# Patient Record
Sex: FEMALE | Race: WHITE | Employment: FULL TIME | URBAN - METROPOLITAN AREA
[De-identification: names, ages, dates, MRNs, and addresses within clinical notes are randomized per-mention and may not be internally consistent; named-entity substitution may affect disease eponyms.]

---

## 2017-08-04 DIAGNOSIS — N90.4 LICHEN SCLEROSUS OF FEMALE GENITALIA: ICD-10-CM

## 2017-08-04 RX ORDER — OSPEMIFENE 60 MG/1
TABLET, FILM COATED ORAL
Qty: 30 TABLET | Refills: 3 | Status: SHIPPED | OUTPATIENT
Start: 2017-08-04 | End: 2017-12-29 | Stop reason: SDUPTHER

## 2017-12-29 DIAGNOSIS — N90.4 LICHEN SCLEROSUS OF FEMALE GENITALIA: ICD-10-CM

## 2018-01-03 RX ORDER — OSPEMIFENE 60 MG/1
TABLET, FILM COATED ORAL
Qty: 30 TABLET | Refills: 0 | Status: SHIPPED | OUTPATIENT
Start: 2018-01-03 | End: 2018-01-31 | Stop reason: SDUPTHER

## 2018-01-31 DIAGNOSIS — N90.4 LICHEN SCLEROSUS OF FEMALE GENITALIA: ICD-10-CM

## 2018-02-01 RX ORDER — OSPEMIFENE 60 MG/1
TABLET, FILM COATED ORAL
Qty: 30 TABLET | Refills: 0 | Status: SHIPPED | OUTPATIENT
Start: 2018-02-01 | End: 2019-12-09 | Stop reason: SDUPTHER

## 2019-04-22 ENCOUNTER — OFFICE VISIT (OUTPATIENT)
Dept: OBGYN | Age: 59
End: 2019-04-22
Payer: COMMERCIAL

## 2019-04-22 ENCOUNTER — HOSPITAL ENCOUNTER (OUTPATIENT)
Age: 59
Setting detail: SPECIMEN
Discharge: HOME | End: 2019-04-22
Payer: COMMERCIAL

## 2019-04-22 VITALS
SYSTOLIC BLOOD PRESSURE: 116 MMHG | HEIGHT: 61 IN | WEIGHT: 152 LBS | BODY MASS INDEX: 28.7 KG/M2 | DIASTOLIC BLOOD PRESSURE: 68 MMHG

## 2019-04-22 DIAGNOSIS — Z01.419 WOMEN'S ANNUAL ROUTINE GYNECOLOGICAL EXAMINATION: Primary | ICD-10-CM

## 2019-04-22 DIAGNOSIS — Z01.419 WOMEN'S ANNUAL ROUTINE GYNECOLOGICAL EXAMINATION: ICD-10-CM

## 2019-04-22 DIAGNOSIS — N90.4 LICHEN SCLEROSUS OF FEMALE GENITALIA: ICD-10-CM

## 2019-04-22 PROCEDURE — G0145 SCR C/V CYTO,THINLAYER,RESCR: HCPCS

## 2019-04-22 PROCEDURE — 99396 PREV VISIT EST AGE 40-64: CPT | Performed by: OBSTETRICS & GYNECOLOGY

## 2019-04-22 RX ORDER — LEVOTHYROXINE SODIUM 0.12 MG/1
TABLET ORAL
Refills: 3 | COMMUNITY
Start: 2019-04-12

## 2019-04-22 RX ORDER — CLOBETASOL PROPIONATE 0.5 MG/G
CREAM TOPICAL
Qty: 30 G | Refills: 1 | Status: SHIPPED | OUTPATIENT
Start: 2019-04-22 | End: 2021-03-30 | Stop reason: SDUPTHER

## 2019-04-22 ASSESSMENT — PATIENT HEALTH QUESTIONNAIRE - PHQ9
SUM OF ALL RESPONSES TO PHQ QUESTIONS 1-9: 0
SUM OF ALL RESPONSES TO PHQ9 QUESTIONS 1 & 2: 0
2. FEELING DOWN, DEPRESSED OR HOPELESS: 0
SUM OF ALL RESPONSES TO PHQ QUESTIONS 1-9: 0
1. LITTLE INTEREST OR PLEASURE IN DOING THINGS: 0

## 2019-04-22 NOTE — PROGRESS NOTES
YEARLY PHYSICAL    Date of service: 2019    Tricia Landis  Is a 66 y.o.   female    PT's PCP is: Rizwana Mills     : 1960                                             Subjective:       No LMP recorded. Patient is postmenopausal.     Are your menses regular: not applicable    OB History    Para Term  AB Living   2 2 2         SAB TAB Ectopic Molar Multiple Live Births                    # Outcome Date GA Lbr Beka/2nd Weight Sex Delivery Anes PTL Lv   2 Term 200 42w0d   M CS-Unspec      1 Term 18 37w0d   F CS-Unspec           Social History     Tobacco Use   Smoking Status Never Smoker   Smokeless Tobacco Never Used        Social History     Substance and Sexual Activity   Alcohol Use Not on file       Family History   Problem Relation Age of Onset    Prostate Cancer Father     Colon Cancer Mother        Allergies: Patient has no known allergies.       Current Outpatient Medications:     levothyroxine (SYNTHROID) 125 MCG tablet, TK 1 T PO QD, Disp: , Rfl: 3    clobetasol (TEMOVATE) 0.05 % cream, Apply a thin layer daily as needed , not to exceed 6 weeks at a time, Disp: 30 g, Rfl: 1    OSPHENA 60 MG TABS, TAKE 1 TABLET BY MOUTH EVERY DAY, Disp: 30 tablet, Rfl: 0    Social History     Substance and Sexual Activity   Sexual Activity Yes    Partners: Male       Any bleeding or pain with intercourse: No    Last Yearly:  2016    Last pap: 2016    Last HPV: never    Last Mammogram: 2018    Last Mariely Gray never    Do you do self breast exams: Yes    Past Medical History:   Diagnosis Date    Thyroid disease        Past Surgical History:   Procedure Laterality Date    CARPAL TUNNEL RELEASE       SECTION         Family History   Problem Relation Age of Onset    Prostate Cancer Father     Colon Cancer Mother        Any family history of breast or ovarian cancer: No    Any family history of blood clots: No      Chief Complaint   Patient presents with    Gynecologic Exam          Nurse: TYSON    PE:  Vital Signs  Blood pressure 116/68, height 5' 1\" (1.549 m), weight 152 lb (68.9 kg), not currently breastfeeding. Labs:    No results found for this visit on 04/22/19. HPI: The patient is here today for a yearly exam.    NoPT denies fever, chills, nausea and vomiting       Objective  Lymphatic:   no lymphadenopathy  Heent:   negative   Cor: regular rate and rhythm, no murmurs              Pul:clear to auscultation bilaterally- no wheezes, rales or rhonchi, normal air movement, no respiratory distress      GI: Abdomen soft, non-tender. BS normal. No masses,  No organomegaly           Extremities: normal strength, tone, and muscle mass   Breasts: Breast:normal appearance, no masses or tenderness, Inspection negative, No nipple retraction or dimpling, No nipple discharge or bleeding, No axillary or supraclavicular adenopathy, Normal to palpation without dominant masses   Pelvic Exam: GENITAL/URINARY:  External Genitalia:  Abnormal findings: The patient does have lichen sclerosis with conglutination of the upper portions of the labia minora and clitoris and clitoral kulkarni  Vagina:  General appearance normal, Discharge absent, Lesions absent, Pelvic support normal, vaginal caliber narrowed  Cervix:  General appearance normal, Lesions absent, Discharge absent, Tenderness absent, Enlargement absent, Nodularity absent  Uterus:  Size normal, Contour normal, Position normal, Masses absent, Consistency; normal, Support normal, Tenderness absent  Adenexa:   Masses absent, Tenderness absent, Enlargement absent, Nodularity absent                                      Vaginal discharge: no vaginal discharge      Uterus: normal size, anteverted, mobile, non-tender, normal shape and consistency     Ovaries: Nonenlarged           Over 50% of time spent on counseling and care coordination on: see assessment and plan Assessment and Plan: I did talk to the patient again regarding her diagnosis of lichen sclerosis and represcribed clobetasol        Diagnosis Orders   1. Women's annual routine gynecological examination  PAP SMEAR   2. Lichen sclerosus of female genitalia  clobetasol (TEMOVATE) 0.05 % cream       I am having Mayra Francis Kil maintain her OSPHENA, levothyroxine, and clobetasol.

## 2019-04-26 LAB — CYTOLOGY REPORT: NORMAL

## 2019-12-09 DIAGNOSIS — N90.4 LICHEN SCLEROSUS OF FEMALE GENITALIA: ICD-10-CM

## 2020-12-30 RX ORDER — OSPEMIFENE 60 MG/1
TABLET, FILM COATED ORAL
Qty: 30 TABLET | Refills: 12 | Status: SHIPPED | OUTPATIENT
Start: 2020-12-30 | End: 2021-03-30 | Stop reason: SDUPTHER

## 2021-03-30 ENCOUNTER — OFFICE VISIT (OUTPATIENT)
Dept: OBGYN | Age: 61
End: 2021-03-30
Payer: COMMERCIAL

## 2021-03-30 ENCOUNTER — HOSPITAL ENCOUNTER (OUTPATIENT)
Age: 61
Setting detail: SPECIMEN
Discharge: HOME | End: 2021-03-30
Payer: COMMERCIAL

## 2021-03-30 VITALS
BODY MASS INDEX: 28.89 KG/M2 | WEIGHT: 153 LBS | SYSTOLIC BLOOD PRESSURE: 130 MMHG | HEIGHT: 61 IN | DIASTOLIC BLOOD PRESSURE: 74 MMHG

## 2021-03-30 DIAGNOSIS — Z01.419 WOMEN'S ANNUAL ROUTINE GYNECOLOGICAL EXAMINATION: ICD-10-CM

## 2021-03-30 DIAGNOSIS — Z12.31 ENCOUNTER FOR SCREENING MAMMOGRAM FOR BREAST CANCER: ICD-10-CM

## 2021-03-30 DIAGNOSIS — N90.4 LICHEN SCLEROSUS OF FEMALE GENITALIA: ICD-10-CM

## 2021-03-30 DIAGNOSIS — Z01.419 WOMEN'S ANNUAL ROUTINE GYNECOLOGICAL EXAMINATION: Primary | ICD-10-CM

## 2021-03-30 PROCEDURE — 99396 PREV VISIT EST AGE 40-64: CPT | Performed by: OBSTETRICS & GYNECOLOGY

## 2021-03-30 PROCEDURE — G0145 SCR C/V CYTO,THINLAYER,RESCR: HCPCS

## 2021-03-30 RX ORDER — OSPEMIFENE 60 MG/1
TABLET, FILM COATED ORAL
Qty: 30 TABLET | Refills: 12 | Status: SHIPPED | OUTPATIENT
Start: 2021-03-30 | End: 2022-03-30

## 2021-03-30 RX ORDER — CLOBETASOL PROPIONATE 0.5 MG/G
CREAM TOPICAL
Qty: 30 G | Refills: 1 | Status: SHIPPED | OUTPATIENT
Start: 2021-03-30

## 2021-03-30 NOTE — PROGRESS NOTES
YEARLY PHYSICAL    Date of service: 3/30/2021    Verónica Marie  Is a 64 y.o.   female    PT's PCP is: Lyle Cortez     : 1960                                             Subjective:       No LMP recorded. Patient is postmenopausal.     Are your menses regular: not applicable    OB History    Para Term  AB Living   2 2 2         SAB TAB Ectopic Molar Multiple Live Births                    # Outcome Date GA Lbr Beka/2nd Weight Sex Delivery Anes PTL Lv   2 Term 200 42w0d   M CS-Unspec      1 Term 18 37w0d   F CS-Unspec           Social History     Tobacco Use   Smoking Status Never Smoker   Smokeless Tobacco Never Used        Social History     Substance and Sexual Activity   Alcohol Use None       Family History   Problem Relation Age of Onset    Prostate Cancer Father     Colon Cancer Mother        Allergies: Patient has no known allergies.       Current Outpatient Medications:     levothyroxine (SYNTHROID) 125 MCG tablet, TK 1 T PO QD, Disp: , Rfl: 3    Ospemifene (OSPHENA) 60 MG TABS, TAKE 1 TABLET BY MOUTH EVERY DAY (Patient not taking: Reported on 3/30/2021), Disp: 30 tablet, Rfl: 12    clobetasol (TEMOVATE) 0.05 % cream, Apply a thin layer daily as needed , not to exceed 6 weeks at a time (Patient not taking: Reported on 3/30/2021), Disp: 30 g, Rfl: 1    Social History     Substance and Sexual Activity   Sexual Activity Yes    Partners: Male       Any bleeding or pain with intercourse: No    Last Yearly:  19    Last pap: 19    Last HPV: never    Last Mammogram: 18    Last Rose Stains never    Do you do self breast exams: Yes    Past Medical History:   Diagnosis Date    Thyroid disease        Past Surgical History:   Procedure Laterality Date    CARPAL TUNNEL RELEASE       SECTION         Family History   Problem Relation Age of Onset    Prostate Cancer Father     Colon Cancer Mother Any family history of breast or ovarian cancer: No    Any family history of blood clots: No      Chief Complaint   Patient presents with    Gynecologic Exam     pt presents for yearly - would like to see if she can go back on osphena          Nurse: zenia    PE:  Vital Signs  Blood pressure 130/74, height 5' 1\" (1.549 m), weight 153 lb (69.4 kg), not currently breastfeeding. Labs:    No results found for this visit on 03/30/21. HPI: The patient is here today for a yearly exam she does want a refill of her medications    NoPT denies fever, chills, nausea and vomiting       Objective  Lymphatic:   no lymphadenopathy  Heent:   negative   Cor: regular rate and rhythm, no murmurs              Pul:clear to auscultation bilaterally- no wheezes, rales or rhonchi, normal air movement, no respiratory distress      GI: Abdomen soft, non-tender. BS normal. No masses,  No organomegaly           Extremities: normal strength, tone, and muscle mass   Breasts: Breast:normal appearance, no masses or tenderness, Inspection negative, No nipple retraction or dimpling, No nipple discharge or bleeding, No axillary or supraclavicular adenopathy, Normal to palpation without dominant masses   Pelvic Exam: GENITAL/URINARY:  External Genitalia:  Abnormal findings: Patient has a significant degree of lichen sclerosis around the clitoral kulkarni and upper labia and also over the superior portion of the perineal body and diffusely over both labia  Vagina:  General appearance normal, Discharge absent, Lesions absent, Pelvic support normal  Cervix:  General appearance normal, Lesions absent, Discharge absent, Tenderness absent, Enlargement absent, Nodularity absent  Uterus:  Size normal, Contour normal, Position normal, Masses absent, Consistency; normal, Support normal, Tenderness absent  Adenexa:   Masses absent, Tenderness absent, Enlargement absent, Nodularity absent                                      Vaginal discharge: no vaginal discharge      Uterus: normal size, anteverted, mobile, non-tender, normal shape and consistency     Ovaries: Nonenlarged nontender           Over 50% of time spent on counseling and care coordination on: see assessment and plan                        Assessment and Plan: Patient has known significant degree of lichen sclerosis so we will refill the steroid cream for this she is using it appropriately we will also refill Osphena for vaginal dryness        Diagnosis Orders   1. Women's annual routine gynecological examination  PAP SMEAR   2. Encounter for screening mammogram for breast cancer  ELY JESSE DIGITAL SCREEN BILATERAL       I am having Mayra Turcios maintain her levothyroxine, clobetasol, and Osphena.

## 2021-04-05 LAB — CYTOLOGY REPORT: NORMAL

## 2022-03-30 DIAGNOSIS — N90.4 LICHEN SCLEROSUS OF FEMALE GENITALIA: ICD-10-CM

## 2022-03-30 RX ORDER — OSPEMIFENE 60 MG/1
TABLET, FILM COATED ORAL
Qty: 30 TABLET | Refills: 12 | Status: SHIPPED | OUTPATIENT
Start: 2022-03-30

## 2023-04-16 DIAGNOSIS — N90.4 LICHEN SCLEROSUS OF FEMALE GENITALIA: ICD-10-CM

## 2023-04-20 ENCOUNTER — TELEPHONE (OUTPATIENT)
Dept: OBGYN | Age: 63
End: 2023-04-20

## 2023-04-20 NOTE — TELEPHONE ENCOUNTER
Patient called and left a voicemail inquiring why her prescription got denied. Attempted to call patient back.  FRANCESCA

## 2023-05-04 RX ORDER — OSPEMIFENE 60 MG/1
TABLET, FILM COATED ORAL
Qty: 30 TABLET | Refills: 12 | OUTPATIENT
Start: 2023-05-04

## 2023-05-19 ENCOUNTER — TELEPHONE (OUTPATIENT)
Dept: OBGYN | Age: 63
End: 2023-05-19

## 2023-06-01 ENCOUNTER — OFFICE VISIT (OUTPATIENT)
Dept: OBGYN | Age: 63
End: 2023-06-01
Payer: COMMERCIAL

## 2023-06-01 ENCOUNTER — HOSPITAL ENCOUNTER (OUTPATIENT)
Age: 63
Setting detail: SPECIMEN
Discharge: HOME | End: 2023-06-01
Payer: COMMERCIAL

## 2023-06-01 VITALS
DIASTOLIC BLOOD PRESSURE: 80 MMHG | BODY MASS INDEX: 28.51 KG/M2 | HEIGHT: 61 IN | WEIGHT: 151 LBS | SYSTOLIC BLOOD PRESSURE: 130 MMHG

## 2023-06-01 DIAGNOSIS — Z01.419 WOMEN'S ANNUAL ROUTINE GYNECOLOGICAL EXAMINATION: Primary | ICD-10-CM

## 2023-06-01 DIAGNOSIS — Z12.31 SCREENING MAMMOGRAM, ENCOUNTER FOR: ICD-10-CM

## 2023-06-01 DIAGNOSIS — N90.4 LICHEN SCLEROSUS OF FEMALE GENITALIA: ICD-10-CM

## 2023-06-01 DIAGNOSIS — Z01.419 WOMEN'S ANNUAL ROUTINE GYNECOLOGICAL EXAMINATION: ICD-10-CM

## 2023-06-01 PROCEDURE — 99396 PREV VISIT EST AGE 40-64: CPT | Performed by: OBSTETRICS & GYNECOLOGY

## 2023-06-01 PROCEDURE — G0145 SCR C/V CYTO,THINLAYER,RESCR: HCPCS

## 2023-06-01 RX ORDER — CLOBETASOL PROPIONATE 0.5 MG/G
CREAM TOPICAL
Qty: 30 G | Refills: 1 | Status: SHIPPED | OUTPATIENT
Start: 2023-06-01

## 2023-06-01 RX ORDER — OSPEMIFENE 60 MG/1
1 TABLET, FILM COATED ORAL DAILY
Qty: 30 TABLET | Refills: 12 | Status: SHIPPED | OUTPATIENT
Start: 2023-06-01

## 2023-06-01 RX ORDER — LIOTHYRONINE SODIUM 5 UG/1
5 TABLET ORAL EVERY MORNING
COMMUNITY
Start: 2023-05-20

## 2023-06-01 NOTE — PROGRESS NOTES
YEARLY PHYSICAL    Date of service: 2023    Jamarcus Wise  Is a 63 y.o.   female    PT's PCP is: No primary care provider on file. : 1960                                             Subjective:       No LMP recorded. Patient is postmenopausal.     Are your menses regular: not applicable    OB History    Para Term  AB Living   2 2 2         SAB IAB Ectopic Molar Multiple Live Births                    # Outcome Date GA Lbr Beka/2nd Weight Sex Delivery Anes PTL Lv   2 Term 200 42w0d   M CS-Unspec      1 Term 18 37w0d   F CS-Unspec           Social History     Tobacco Use   Smoking Status Never   Smokeless Tobacco Never        Social History     Substance and Sexual Activity   Alcohol Use Not Currently       Family History   Problem Relation Age of Onset    Prostate Cancer Father     Colon Cancer Mother        Allergies: Patient has no known allergies.       Current Outpatient Medications:     liothyronine (CYTOMEL) 5 MCG tablet, Take 1 tablet by mouth every morning, Disp: , Rfl:     Ospemifene (OSPHENA) 60 MG TABS, Take 1 tablet by mouth daily, Disp: 30 tablet, Rfl: 12    clobetasol (TEMOVATE) 0.05 % cream, Apply a thin layer daily as needed , not to exceed 6 weeks at a time, Disp: 30 g, Rfl: 1    levothyroxine (SYNTHROID) 125 MCG tablet, TK 1 T PO QD, Disp: , Rfl: 3    Social History     Substance and Sexual Activity   Sexual Activity Yes    Partners: Male    Birth control/protection: Post-menopausal       Any bleeding or pain with intercourse: No    Last Yearly:  3/30/21    Last pap: 3/30/21    Last HPV: never    Last Mammogram: 18    Last Dexascan never    Do you do self breast exams: Yes    Past Medical History:   Diagnosis Date    Thyroid disease        Past Surgical History:   Procedure Laterality Date    CARPAL TUNNEL RELEASE       SECTION         Family History   Problem Relation Age of Onset

## 2023-06-07 LAB — CYTOLOGY REPORT: NORMAL

## 2023-12-05 ENCOUNTER — HOSPITAL ENCOUNTER
Dept: HOSPITAL 101 - MAMMO | Age: 63
Discharge: HOME | End: 2023-12-05
Payer: COMMERCIAL

## 2023-12-05 DIAGNOSIS — Z80.0: ICD-10-CM

## 2023-12-05 DIAGNOSIS — Z80.42: ICD-10-CM

## 2023-12-05 DIAGNOSIS — Z12.31: Primary | ICD-10-CM

## 2023-12-05 PROCEDURE — 77063 BREAST TOMOSYNTHESIS BI: CPT

## 2023-12-05 PROCEDURE — 77067 SCR MAMMO BI INCL CAD: CPT

## 2023-12-05 NOTE — MM_ITS
Patient Name:      
BURTON RAMÍREZ 
      
MR#: [id number]      
: 1960 
Accession #: [id number] 
Exam Date: 2023  
Ordering Doctor: MRS. JODI L. SCHWAB . 
  
RADIOLOGY REPORT 
  
PROCEDURE:     MM TOMOSYNTHESIS SCREENING BI 
  
COMPARISON:     MG MAMM SCREEN TOM W CAD, 2020.  MG MAMM SCREEN 3D TOM  
CAD, 2021. 
  
INDICATIONS:     Tom Screening Mammogram 
  
Calculator Name             NCI Breast Cancer Risk Assessment Tool  
5 Year Breast Cancer Risk     1.60% 
Lifetime Breast Cancer Risk     7.90% 
Personal Breast Cancer      No 
Personal Ovarian Cancer     No 
Treatments     None 
Family Cancers     Mother with colon  cancer at age 72; Father with prostate  
cancer at age 74. 
  
LOCATION:       [facility]  
  
BREAST COMPOSITION:     Scattered areas fibroglandular density. 
  
FINDINGS:       
DIAGNOSTIC CATEGORY 1--NEGATIVE. NO CHANGE FROM COMPARISON ASSESSMENT.   
Scattered benign-appearing calcifications are present.  Scattered  
benign-appearing lymph nodes are present.   
  
RIGHT BREAST:  No significant suspicious finding.   
  
LEFT BREAST:  No significant suspicious finding.   
  
RECOMMENDATIONS:      
ROUTINE MAMMOGRAM AND CLINICAL EVALUATION IN 12 MONTHS.   
  
PLEASE NOTE:  A NORMAL MAMMOGRAM DOES NOT EXCLUDE THE POSSIBILITY OF BREAST  
CANCER.  A CLINICALLY SUSPICIOUS PALPABLE LUMP SHOULD BE BIOPSIED.   
  
  
Dictated by: Aly Bai MD on 2023 at 09:32      
Approved by: Aly Bai MD on 2023 at 09:37

## 2024-07-20 DIAGNOSIS — N90.4 LICHEN SCLEROSUS OF FEMALE GENITALIA: ICD-10-CM

## 2024-07-22 RX ORDER — OSPEMIFENE 60 MG/1
1 TABLET, FILM COATED ORAL DAILY
Qty: 30 TABLET | Refills: 12 | OUTPATIENT
Start: 2024-07-22

## 2024-09-19 ENCOUNTER — HOSPITAL ENCOUNTER
Dept: HOSPITAL 101 - EC | Age: 64
Discharge: HOME | End: 2024-09-19
Payer: COMMERCIAL

## 2024-09-19 DIAGNOSIS — M79.672: Primary | ICD-10-CM

## 2024-09-19 DIAGNOSIS — M79.671: ICD-10-CM

## 2024-09-19 PROCEDURE — 73630 X-RAY EXAM OF FOOT: CPT

## 2024-09-19 NOTE — XR_ITS
The [address] [address] 
     [phone] 
  
  
Patient Name: 
BURTON RAMÍREZ 
  
MRN: [id number]    YOB: 1960    Sex: F 
Assigned Patient Location:  
Current Patient Location:   
Accession/Order Number: [id number] 
Exam Date: 9/19/2024  15:50    Report Date: 9/20/2024  08:34 
  
At the request of: 
GERALDINE MONTANA   
  
Procedure:  XR foot TOM min 3V 
  
EXAMINATION: XR foot TOM min 3V  
  
HISTORY: BILATERAL FOOT PAIN 
  
COMPARISON: No relevant comparison available.  
  
FINDINGS:  
  
RIGHT FINDINGS: 
BONES: No acute fracture or dislocation. Mild degenerative changes  
SOFT TISSUES: Negative. No visible soft tissue swelling.  
OTHER: Negative.  
  
LEFT FINDINGS: 
BONES: No acute fracture or dislocation. Moderate to severe degenerative  
change  
first metatarsal-phalangeal joint with bone-on-bone articulation and marginal  
osteophyte formation  
SOFT TISSUES: Negative. No visible soft tissue swelling.  
OTHER: Negative.  
  
ORDER #: [id number] XR/XR foot TOM min 3V  
IMPRESSION:   
   
Degenerative changes most significant left first metatarsal-phalangeal joint  
   
   
Electronically authenticated by: AMBROSIO SALDANA   Date: 9/20/2024  08:34

## 2025-01-07 ENCOUNTER — HOSPITAL ENCOUNTER
Dept: HOSPITAL 101 - PST | Age: 65
Discharge: HOME | End: 2025-01-07
Payer: COMMERCIAL

## 2025-01-07 DIAGNOSIS — Z12.11: Primary | ICD-10-CM

## 2025-01-15 ENCOUNTER — HOSPITAL ENCOUNTER (OUTPATIENT)
Age: 65
Discharge: HOME | End: 2025-01-15
Payer: COMMERCIAL

## 2025-01-15 VITALS — BODY MASS INDEX: 30.7 KG/M2

## 2025-01-15 VITALS — OXYGEN SATURATION: 99 % | SYSTOLIC BLOOD PRESSURE: 144 MMHG | HEART RATE: 69 BPM | DIASTOLIC BLOOD PRESSURE: 73 MMHG

## 2025-01-15 VITALS — SYSTOLIC BLOOD PRESSURE: 129 MMHG | OXYGEN SATURATION: 99 % | HEART RATE: 68 BPM | DIASTOLIC BLOOD PRESSURE: 74 MMHG

## 2025-01-15 VITALS
DIASTOLIC BLOOD PRESSURE: 85 MMHG | TEMPERATURE: 97.5 F | SYSTOLIC BLOOD PRESSURE: 157 MMHG | OXYGEN SATURATION: 100 % | HEART RATE: 81 BPM

## 2025-01-15 VITALS
OXYGEN SATURATION: 98 % | TEMPERATURE: 97.9 F | HEART RATE: 87 BPM | SYSTOLIC BLOOD PRESSURE: 106 MMHG | DIASTOLIC BLOOD PRESSURE: 73 MMHG

## 2025-01-15 DIAGNOSIS — Z87.891: ICD-10-CM

## 2025-01-15 DIAGNOSIS — Z90.49: ICD-10-CM

## 2025-01-15 DIAGNOSIS — Z80.0: ICD-10-CM

## 2025-01-15 DIAGNOSIS — Z12.11: Primary | ICD-10-CM

## 2025-01-15 DIAGNOSIS — E03.9: ICD-10-CM

## 2025-01-15 PROCEDURE — 45378 DIAGNOSTIC COLONOSCOPY: CPT

## 2025-01-15 NOTE — XMS_ITS
Comprehensive CCD (C-CDA v2.1)  
  
                          Created on: January 15, 2025  
  
  
BURTON RAMÍREZ  
External Reference #: CDR,PersonID:[id number]  
: 1960  
Sex: Female  
  
Demographics  
  
  
                                        Address             [address] [place] [address] [address]  
[place], [place]  [id number]  
   
                                        Home Phone          [phone]  
   
                                        Mobile Phone        [phone]  
   
                                        Preferred Language  en  
   
                                        Marital Status        
   
                                        Orthodox Affiliation Unknown  
   
                                        Race                White  
   
                                        Ethnic Group        Not  or Lati  
no  
  
  
Author  
  
  
                                        Organization        [facility]  
  
  
Care Team Providers  
  
  
                                Care Team Member Name Role            Phone  
   
                                Melinda Wilson   Primary Care Provider [phone]  
   
                                DENG, DR MELINDA OWUSU Admitting       Unavailable  
   
                                DENG, DR MELINDA OWUSU Attending       Unavailable  
   
                                WILSON, DR MELINDA OWUSU Primary Care    Unavailable  
   
                                WILSON, DR MELINDA OWUSU Consulting      Unavailable  
   
                                Unavailable     Primary Care Provider UnavailMANUEL Rich Referring       Unavailable  
   
                                Schwab, Agata GUNN  Primary Care Physician [phone]  
   
                                Schwab, FNP Agata L Attending       Unavailable  
   
                                Schwab, FNP Agata L Admitting       Unavailable  
   
                                NILL, Jeovanny WEBER Attending       Unavailable  
   
                                Schwab, FNP Agata L Referring       Unavailable  
   
                                NILL, Jeovanny WEBER Attending       Unavailable  
   
                                Schwab, FNP Agata L Referring       Unavailable  
   
                                Schwab, FNP Agata L Attending       Unavailable  
   
                                Schwab, FNP Agata L Attending       Unavailable  
   
                                Schwab, FNP Agata L Attending       Unavailable  
   
                                Schwab, FNP Agata L Attending       Unavailable  
  
  
  
Medications  
Current Medications  
  
  
  
                      Medication Drug Class(es) Dates      Sig (Normalized) Sig   
(Original)  
   
                                                    clobetasol   
propionate 0.5 mg/ml   
topical cream  
(2 sources)               Corticosteroid            Start:   
2023                                          clobetasol   
(TEMOVATE) 0.05 %   
cream Indications:   
Lichen sclerosus of   
female genitalia   
Apply a thin layer   
daily as needed ,   
not to exceed 6   
weeks at a time 30   
g 2023   
Active  
  
  
  
                                Start: 2021                 clobetasol (TE  
MOVATE) 0.05 % cream Indications: Lichen   
sclerosus   
of female genitalia Apply a thin layer daily as needed , not to   
exceed 6 weeks at a time 30 g 2021 Active  
  
  
  
                                                    levothyroxine sodium   
0.125 mg oral tablet  
(5 sources)         l-Thyroxine         Start: 2024   take 1 tablet   
by mouth once   
daily                                   levothyroxine 125   
mcg (0.125 mg) Tab   
125 mcg = 1 tab(s),   
Oral, Daily, # 90   
tab(s), Refills(s)   
3, Pharmacy:   
[facility]   
#[id number], 152.8, cm,   
11/15/23 15:35:00   
EST, Height/Length   
Dosing, 72.9, kg,   
11/15/23 15:35:00   
EST, Weight Dosing   
Start Date: 24   
Status: Ordered  
  
  
  
                                        Start: 2024   take 1 tablet by summer  
th once   
daily                                   levothyroxine 125 mcg (0.125 mg) Tab   
125 mcg = 1 tab(s), Oral, Daily, # 90   
tab(s), Refills(s) 0, Pharmacy:   
[facility] #[id number], 152.8,   
cm, 11/15/23 15:35:00 EST,   
Height/Length Dosing, 72.9, kg,   
11/15/23 15:35:00 EST, Weight Dosing   
Start Date: 24 Status: Ordered  
   
                                        Start: 2019   take 1 tablet by summer  
th once   
daily                                   levothyroxine (SYNTHROID) 125 MCG   
tablet TK 1 T PO QD 3 2019   
Active  
  
  
  
                                                    liothyronine   
sodium 0.005 mg   
oral tablet  
(1 source)                l-Triiodothyronine        Start:   
2023                              take 1   
tablet by   
mouth once   
daily in the   
morning                                 liothyronine   
(CYTOMEL) 5 MCG   
tablet Take 1   
tablet by mouth   
every morning 0   
2023 Active  
   
                                                    meloxicam 15 mg   
oral tablet  
(2 sources)                             Nonsteroidal   
Anti-inflammatory Drug                  Start:   
2024                              take 1   
tablet by   
mouth once   
daily                                   meloxicam 15 mg   
Tab See   
Instructions,   
TAKE 1 TABLET BY   
MOUTH DAILY, # 30   
tab(s),   
Refills(s) 0,   
Pharmacy:   
[facility] #[id number],   
152.8, cm,   
24 16:53:00   
EDT,   
Height/Length   
Dosing, 71.2, kg,   
24 16:53:00   
EDT, Weight   
Dosing Start   
Date: 24   
Status: Ordered  
  
  
  
                                        Start: 10-   take 1 tablet by summer  
th once   
daily                                   meloxicam 15 mg Tab See Instructions,   
TAKE 1 TABLET BY MOUTH DAILY, # 30   
tab(s), Refills(s) 0, Pharmacy:   
[facility] #[id number], 152.8,   
cm, 24 16:53:00 EDT,   
Height/Length Dosing, 71.2, kg,   
24 16:53:00 EDT, Weight Dosing   
Start Date: 10/7/24 Status: Ordered  
  
  
  
                                                    ospemifene 60 mg oral   
tablet  
(2 sources)                             Start: 2023   take 1 tablet by   
mouth once daily                        Ospemifene (OSPHENA) 60   
MG TABS Indications:   
Lichen sclerosus of   
female genitalia Take 1   
tablet by mouth daily 30   
tablet 12 2023   
Active  
  
  
  
                                        Start: 2021   take 1 tablet by summer  
th once   
daily                                   Ospemifene (OSPHENA) 60 MG TABS   
Indications: Lichen sclerosus of   
female genitalia TAKE 1 TABLET BY   
MOUTH EVERY DAY 30 tablet 12   
2021 Active  
  
  
  
  
  
Problems  
  
  
                                                    Problem   
Classification  Problem         Date            Documented Date Episodic/Chronic  
   
                                                    Other connective   
tissue disease  
(2 sources)     Foot pain                       2024      Episodic  
   
                                                    Other nutritional;   
endocrine; and   
metabolic disorders  
(2 sources)                             Body mass index 30+ -   
obesity                                 2024          Chronic  
   
                                                    Other nutritional;   
endocrine; and   
metabolic disorders  
(2 sources)                             Obesity caused by   
energy imbalance                        2024          Chronic  
   
                                                    Other screening for   
suspected conditions   
(not mental disorders   
or infectious   
disease)  
(1 source)                              Screening for   
malignant neoplasm of   
colon done;   
Translations:   
[Encounter for   
screening for   
malignant neoplasm of   
colon]                                  Onset:   
2024                                          Episodic  
   
                                                    Residual codes;   
unclassified  
(1 source)                              Family history of   
cancer of colon                         2024          Episodic  
   
                                                    Thyroid disorders  
(3 sources)     Hypothyroidism                  11-      Chronic  
   
                                                    Unclassified  
(6 sources)                             Patient encounter   
status; Translations:   
[Women's annual   
routine gynecological   
examination]                            2024            
  
  
  
Results  
  
  
                          Test Name    Value        Interpretation Reference   
[facility]                                   Facility  
   
                                                    Ambulatory Visit Summaryon 1  
2024   
   
                                                    Ambulatory Visit   
Summary                                 Ambulatory Visit   
Summary  
[Image Removed]  
BURTON RAMÍREZ  
:1960  
MRN:[id number]  
Visit   
Date:2024  
Ambulatory Visit   
Instructions  
Your Diagnosis  
Screening for   
malignant neoplasm   
of colon  
Your Care Team  
Attending   
Physician -  
Jeovanny ROWAN MD  
Primary Care   
Physician -  
Schwab FNP, Jodi L  
Referring   
Physician -  
Schwab FNP, Jodi L  
This Is Your   
Medications List  
Contact   
prescribing   
physician if   
questions or   
concerns  
levothyroxine   
(levothyroxine 125   
mcg (0.125 mg)   
Tab)  
meloxicam   
(meloxicam 15 mg   
Tab)  
Procedures   
Performed  
Appendectomy,   
Carpal tunnel   
release,   
Colonoscopy.  
Discharge Vitals  
Heart Rate   
(Peripheral)  
70  
Respiratory Rate  
16  
Blood Pressure  
122/84  
Height  
152.8 cm  
Height  
60 in  
Weight  
73.6 kg  
Weight  
162.26 lb  
BMI  
31.52  
Medications  
What How Much When   
Instructions  
Unchanged   
levothyroxine   
(levothyroxine 125   
mcg (0.125 mg)   
Tab) 1 Tablets By   
Mouth Every day   
Contact   
prescribing   
physician if   
questions or   
concerns  
Unchanged   
meloxicam   
(meloxicam 15 mg   
Tab) See   
instructions TAKE   
1 TABLET BY MOUTH   
DAILY Contact   
prescribing   
physician if   
questions or   
concerns  
Allergies  
No Known Allergies  
Problems  
Ongoing - Any   
problem that you   
are currently   
receiving   
treatment for.  
BMI   
31.0-31.9,adult  
Class 1 obesity   
due to excess   
calories with body   
mass index (BMI)   
of 30.0 to 30.9 in   
adult  
Colon cancer   
screening  
Family history of   
colon cancer in   
mother  
Hypothyroidism  
Left foot pain  
Screening for   
malignant neoplasm   
of colon  
Wellness   
examination  
Patient Survey  
You may receive a   
survey via text or   
e-mail asking   
about your office   
visit. Please   
share your   
experience with us   
by completing your   
survey. We   
appreciate your   
feedback and thank   
you for choosing   
us for your care.  
[Image Removed]     Normal                                  [facility]  
   
                                                    Family Medicine Office/Clini  
c Noteon 2024   
   
                                                    Family Medicine   
Office/Clinic Note                      Family Medicine   
Office/Clinic Note  
Chief Complaint  
Annual Wellness  
HPI Staff  
Pt is here today   
for annual   
wellness visit.  
Health   
Maintenance:  
Colonoscopy:    
(Normal) believes   
she is due for   
next one.  
Mammo:    
(Normal)  
Pap:  (Normal)   
due  
Last Labs: 23  
Lt foot pain and   
swelling.  
  
History of Present   
Illness  
pt presents today   
for wellness visit  
Review of Systems  
PHQ Score  
Initial Depression   
Screen Score: 0   
SCORE  
Physical Exam  
Vitals &   
Measurements  
T: 36.7   
?C(Temporal   
Artery) HR:   
71(Peripheral) RR:   
16 BP: 128/80   
SpO2: 97%  
HT: 60 in HT:   
152.8 cm WT: 71.20   
kg WT: 156.64 lb   
BMI: 30.5  
General: alert, no   
acute distress  
ENMT: oral mucosa   
moist, no   
pharyngeal   
erythema or   
exudate  
Cardiovascular:   
regular rate and   
rhythm, normal   
peripheral   
perfusion  
Respiratory: Lungs   
CTA, respirations   
non labored  
Extremities: no   
deformity, no   
trauma  
Neurological:   
oriented x 4, LOC   
appropriate for   
age, CN II-XII   
intact, motor   
strength equal &   
normal   
bilaterally,   
speech normal  
Assessment/Plan  
1. Wellness   
examination   
(Z00.00: Encounter   
for general adult   
medical   
examination   
without abnormal   
findings)  
pt presents for   
wellness visit. is   
not quite due for   
labs. does not   
need refills at   
this time. RTC in   
November for lab   
draw  
Ordered:  
CBC w/ Auto Diff  
Comprehensive   
Metabolic Panel  
Est Preventative   
40 to 64 years   
[address]  
Lipid Panel  
Thyroid   
Stimulating   
Hormone  
  
2. Colon cancer   
screening (Z12.11:   
Encounter for   
screening for   
malignant neoplasm   
of colon)  
pt would like   
colonoscopy in   
Given referral   
sent  
Ordered:  
CBC w/ Auto Diff  
Comprehensive   
Metabolic Panel  
Est Preventative   
40 to 64 years   
[address]  
[facility]   
Ambulatory   
Referral  
Lipid Panel  
Thyroid   
Stimulating   
Hormone  
  
3. Hypothyroidism   
(E03.9:   
Hypothyroidism,   
unspecified)  
TSH ordered for   
after   
Ordered:  
CBC w/ Auto Diff  
Comprehensive   
Metabolic Panel  
Est Preventative   
40 to 64 years   
[address]  
Lipid Panel  
Thyroid   
Stimulating   
Hormone  
  
4. Left foot pain   
(M79.672: Pain in   
left foot)  
slight swelling   
noted on top of   
foot above big   
toe. pt is also   
c/o pain on inside   
arch of foot as   
well. will send   
referral to Dr. izquierdo for   
further   
evaluation. pt   
walks on concrete   
8 hours a day at   
work. encouraged   
good supportive   
shoes  
Ordered:  
Est Preventative   
40 to 64 years   
[address]  
[facility]   
Ambulatory   
Referral  
  
5. BMI   
30.0-30.9,adult,   
(Z68.30: Body mass   
index [BMI]   
30.0-30.9,   
adult)Body mass   
index [BMI]   
30.0-30.9, adult  
BMI education   
given  
Ordered:  
meloxicam, 15 mg =   
1 tab(s), Oral,   
Daily, # 30   
tab(s), Refills(s)   
0, Pharmacy:   
[facility] #[id number],   
152.8, cm,   
24 16:53:00   
EDT, Height/Length   
Dosing, 71.2, kg,   
24 16:53:00   
EDT, Weight Dosing  
CBC w/ Auto Diff  
Comprehensive   
Metabolic Panel  
Est Preventative   
40 to 64 years   
[address]  
[facility]   
Ambulatory   
Referral  
[facility]   
Ambulatory   
Referral  
Lipid Panel  
Thyroid   
Stimulating   
Hormone  
  
6. Former smoker   
(Z87.891: Personal   
history of   
nicotine   
dependence)  
continue not   
smoking  
Ordered:  
meloxicam, 15 mg =   
1 tab(s), Oral,   
Daily, # 30   
tab(s), Refills(s)   
0, Pharmacy:   
[facility]   
[facility] #[id number],   
152.8, cm,   
24 16:53:00   
EDT, Height/Length   
Dosing, 71.2, kg,   
24 16:53:00   
EDT, Weight Dosing  
CBC w/ Auto Diff  
Comprehensive   
Metabolic Panel  
Est Preventative   
40 to 64 years   
[address]  
Lipid Panel  
Thyroid   
Stimulating   
Hormone  
  
7. Class 1 obesity   
due to excess   
calories with body   
mass index (BMI)   
of 30.0 to 30.9 in   
adult (E66.09:   
Other obesity due   
to excess   
calories)  
see above  
Ordered:  
meloxicam, 15 mg =   
1 tab(s), Oral,   
Daily, # 30   
tab(s), Refills(s)   
0, Pharmacy:   
[facility]   
[facility] #[id number],   
152.8, cm,   
24 16:53:00   
EDT, Height/Length   
Dosing, 71.2, kg,   
24 16:53:00   
EDT, Weight Dosing  
CBC w/ Auto Diff  
Comprehensive   
Metabolic Panel  
Est Preventative   
40 to 64 years   
[address]  
Lipid Panel  
Thyroid   
Stimulating   
Hormone  
  
Follow-up  
No qualifying data   
available  
Problem List/Past   
Medical History  
Ongoing  
BMI   
30.0-30.9,adult  
Class 1 obesity   
due to excess   
calories with body   
mass index (BMI)   
of 30.0 to 30.9 in   
adult  
Colon cancer   
screening  
Hypothyroidism  
Left foot pain  
Wellness   
examination  
Historical  
No qualifying data  
Procedure/Surgical   
History  
Appendectomy,   
Surgery.  
Medications  
levothyroxine 125   
mcg (0.125 mg)   
Tab, 125 mcg= 1   
tab(s), Oral,   
Daily, 3 refills  
meloxicam 15 mg   
Tab, 15 mg= 1   
tab(s), Oral,   
Daily  
Allergies  
No Known Allergies  
Social History  
Tobacco  
Former smoker,   
quit more than 30   
days ago Tobacco   
Use:. Never   
Smokeless Tobacco   
Use:. Cigarettes,   
Household tobacco   
concerns: No. Yes,   
2024  
Family History  
Cancer: Mother and   
Father.             Normal                                  [facility]  
   
                                        Comment on above:   Result Comment: Elec  
tronically Signed By: Schwab FNP, Jodi L\.br\Date and Time Signed: 24 17:18 EDT   
   
                                                    Reminderson 02-   
   
                                        Reminders           ------------------  
---  
From: Schwab FNP, Jodi L  
To: [facility] -   
Clinical;  
Sent: 2/15/2024   
08:20:47 EST Show   
up: 2/15/2024   
08:21:00 EST  
Subject:   
Ambulatory   
Reminder  
Due Date/Time:   
2024 08:20:00   
EST  
TSh is normal  
Results:  
Date Result Name   
Value Ref Range  
2024 15:39   
TSH 1.32 mcIU/mL   
(0.34 - 5.60)  
Patient informed   
and voices   
understanding.      Normal                                  [facility]  
   
                                                    Ambulatory Visit Summaryon 0  
2024   
   
                                                    Ambulatory Visit   
Summary                                 [Image Removed]  
BURTON RAMÍREZ  
:1960  
MRN:[id number]  
Visit   
Date:2024  
Ambulatory Visit   
Instructions  
Your Care Team  
Attending   
Physician -  
Schwab FNP, Jodi L  
Primary Care   
Physician -  
Schwab FNP, Jodi L  
This Is Your   
Medications List  
levothyroxine   
(levothyroxine 125   
mcg (0.125 mg)   
Tab)  
Procedures   
Performed  
Appendectomy,   
Surgery.  
Medications  
What How Much When   
Instructions  
Unchanged   
levothyroxine   
(levothyroxine 125   
mcg (0.125 mg)   
Tab) 1 Tablets By   
Mouth Every day  
Allergies  
No Known Allergies  
Problems  
Ongoing - Any   
problem that you   
are currently   
receiving   
treatment for.  
Hypothyroidism  
Patient Survey  
You may receive a   
survey via text or   
e-mail asking   
about your office   
visit. Please   
share your   
experience with us   
by completing your   
survey. We   
appreciate your   
feedback and thank   
you for choosing   
us for your care.  
[Image Removed]     Normal                                  [facility]  
   
                                                    CHEMISTRYOrdered By: SYSTEM   
SYSTEM on 2024   
   
                          TSH Qn       1.32 m[IU]/L Normal       0.34 - 5.60   
mcIU/mL                                 [facility]  
   
                                                    Nurse Consultation Noteon    
   
                                                    Nurse Consultation   
Note                                    Reason for Visit  
blood draw  
Assessment/Plan  
This nurse   
verifies patient's   
 and order.   
Area is cleansed   
with alcohol swab,   
turnique applied.   
Blood is drawn   
into a light green   
tube out of the   
left antecubital   
area. bandage   
applied to area.  
Medications  
levothyroxine 125   
mcg (0.125 mg)   
Tab, 125 mcg= 1   
tab(s), Oral,   
Daily  
Allergies  
No Known Allergies  Normal                                  [facility]  
   
                                                    TSHon 2024   
   
                      TSH Qn     1.32 m[IU]/L Normal     0.34-5.60  [facility]  
   
                                        Comment on above:   Performed By: #### 2  
[id number] ####  
[facility]  
[address] [place] [address]   
   
                                                    CHEMISTRYOrdered By: SYSTEM   
SYSTEM on 2023   
   
                          Albumin [Mass/Vol] 4.2 g/dL     Normal       3.3 - 5.0  
   
gm/dL                                   [facility]  
   
                                                    Albumin/Globulin [Mass   
ratio]          1.3 {ratio}     Normal          1.1 - 2.2       [facility]  
   
                                                    ALP [Catalytic   
activity/Vol]       63 [iU]/d           Normal              21 - 98   
Int._Unit/L                             [facility]  
   
                                                    ALT No additional   
P-5'-P [Catalytic   
activity/Vol]       16 [iU]/d           Normal              6 - 46   
Int._Unit/L                             [facility]  
   
                      Anion gap [Moles/Vol] 10 mmol/L  Normal     6 - 16 mEq/L F  
[facility]  
   
                                                    AST [Catalytic   
activity/Vol]       19 [iU]/d           Normal              5 - 43   
Int._Unit/L                             [facility]  
   
                          Bilirubin [Mass/Vol] 0.4 mg/dL    Normal       0.0 - 1  
.1   
mg/dL                                   [facility]  
   
                          Calcium [Mass/Vol] 10.0 mg/dL   Normal       8.9 - 11.  
1   
mg/dL                                   [facility]  
   
                          Chloride [Moles/Vol] 106 mmol/L   Normal       101 - 1  
11   
mmol/L                                  [facility]  
   
                          Cholesterol [Mass/Vol] 209 mg/dL    High         120 -  
 200   
mg/dL                                   [facility]  
   
                                                    Cholesterol in HDL   
[Mass/Vol]                67 mg/dL                  Invalid   
Interpretation   
Code                                                [facility]  
   
                                        Comment on above:   Interpretive Data: H  
DL > or equal to 60 mg/dL: Low   
cardiovascular risk  
HDL < 40 mg/dL : High cardiovascular risk   
   
                                                    Cholesterol in LDL   
[Mass/Vol]      117 mg/dL       Normal          <=129mg/dL      [facility]  
   
                                                    Cholesterol in VLDL   
[Mass/Vol]      17 mg/dL        Normal          7 - 40 mg/dL    [facility]  
   
                          CO2 [Moles/Vol] 29 mmol/L    Normal       21 - 31   
mmol/L                                  [facility]  
   
                          Creatinine [Mass/Vol] 0.8 mg/dL    Normal       0.5 -   
1.3   
mg/dL                                   [facility]  
   
                                                    GFR/1.73 sq   
M.predicted among   
non-blacks MDRD   
(S/P/Bld) [Vol   
rate/Area]          84 mL/min/1.73 m2   Normal              >=59mL/min/1.  
73 m2                                   [facility]  
   
                                        Comment on above:   Interpretive Data: C  
hronic kidney disease could be indicated   
at   
eGFR's of less than 60 mL/min/1.73m2. Kidney failure is   
indicated at less than 15 mL/min/1.73m2.   
   
                                                    Globulin (S)   
[Mass/Vol]          3.2 g/dL            Normal              1.4 - 4.0   
gm/dL                                   [facility]  
   
                          Glucose [Mass/Vol] 97 mg/dL     Normal       55 - 199   
mg/dL                                   [facility]  
   
                                        Comment on above:   Interpretive Data: I  
f this glucose result represents a fasting  
   
glucose, interpretation should refer to the following reference   
range: 55-99 mg/dL   
   
                          Potassium [Moles/Vol] 4.2 mmol/L   Normal       3.5 -   
5.3   
mmol/L                                  [facility]  
   
                          Protein [Mass/Vol] 7.4 g/dL     Normal       6.0 - 7.8  
   
gm/dL                                   [facility]  
   
                          Sodium [Moles/Vol] 141 mmol/L   Normal       135 - 145  
   
mmol/L                                  [facility]  
   
                                                    Triglyceride   
[Mass/Vol]      85 mg/dL        Normal          <=149mg/dL      [facility]  
   
                          TSH Qn       1.81 m[IU]/L Normal       0.34 - 5.60   
mcIU/mL                                 [facility]  
   
                                                    Urea nitrogen   
[Mass/Vol]      29 mg/dL        High            5 - 21 mg/dL    [facility]  
   
                                                    Urea   
nitrogen/Creatinine   
[Mass ratio]    36 mg/mg        High            10 - 20         [facility]  
   
                                                    HEMATOLOGYOrdered By: SYSTEM  
 SYSTEM on 2023   
   
                                                    Basophils/100 WBC   
(Bld)           0.4 %           Normal          0.0 - 2.0 %     [facility]   
[facility]  
   
                                                    Basophils/Leukocytes   
Auto (Bld) [Pure #   
fraction]           0.0 E9/L            Normal              0.0 - 0.2   
E9/L                                    [facility]   
[facility]  
   
                                                    Eosinophils/100 WBC   
(Bld)           2.8 %           Normal          0.0 - 8.0 %     [facility]   
[facility]  
   
                                                    Eosinophils/Leukocytes   
Auto (Bld) [Pure #   
fraction]           0.2 E9/L            Normal              0.0 - 0.5   
E9/L                                    [facility]   
[facility]  
   
                                                    Lymphocytes/100 WBC   
(Bld)           28.1 %          Normal          14.0 - 50.0 %   [facility]   
[facility]  
   
                                                    Lymphocytes/Leukocytes   
Auto (Bld) [Pure #   
fraction]           2.0 E9/L            Normal              1.0 - 4.0   
E9/L                                    [facility]   
[facility]  
   
                                                    Monocytes/100 WBC   
(Bld)           8.2 %           Normal          4.0 - 14.0 %    [facility]   
[facility]  
   
                                                    Monocytes/Leukocytes   
Auto (Bld) [Pure #   
fraction]           0.6 E9/L            Normal              0.2 - 1.0   
E9/L                                    [facility]   
[facility]  
   
                                                    Neutrophils/100 WBC   
(Bld)           60.5 %          Normal          36.0 - 75.0 %   [facility]   
[facility]  
   
                                                    Neutrophils/Leukocytes   
Auto (Bld) [Pure #   
fraction]           4.4 E9/L            Normal              2.0 - 7.5   
E9/L                                    [facility]   
[facility]  
   
                                                    HEMATOLOGYOrdered By: Edith Neal on 2023   
   
                                                    Erythrocyte   
distribution width   
(RBC) [Ratio]   13.6 %          Normal          10.9 - 14.2 %   [facility]   
[facility]  
   
                                                    Hematocrit (Bld)   
[Volume fraction] 39.3 %          Normal          34.0 - 46.0 %   [facility]   
[facility]  
   
                                                    Hemoglobin (Bld)   
[Mass/Vol]          13.1 g/dL           Normal              12.0 - 16.0   
gm/dL                                   [facility]   
[facility]  
   
                                                    MCH (RBC) [Entitic   
mass]               31.1 pg             Normal              27.0 - 34.0   
pg                                      [facility]   
[facility]  
   
                          MCHC (RBC) [Mass/Vol] 33.3 g/dL    Normal       31.4 -  
 36.0   
gm/dL                                   [facility]   
[facility]  
   
                                                    MCV (RBC) [Entitic   
vol]                93.6 fL             Normal              80.0 - 100.0   
fL                                      [facility]   
[facility]  
   
                                                    Platelet mean volume   
(Bld) [Entitic vol] 9.0 fL          Normal          6.4 - 10.8 fL   [facility]   
[facility]  
   
                                                    Platelets (Bld)   
[#/Vol]             306.0 E9/L          Normal              150.0 - 500.0   
E9/L                                    [facility]   
[facility]  
   
                          RBC (Bld) [#/Vol] 4.2 E12/L    Low          4.3 - 5.9   
E12/L                                   [facility]   
[facility]  
   
                                                    WBC corrected for nucl   
RBC Auto (Bld) [#/Vol] 7.2 E9/L            Normal              4.0 - 11.0   
E9/L                                    [facility]   
[facility]  
   
                                                    Cytologyon 2023   
   
                                        Cytology            (NOTE)  
Path Number:   
[id number]  
DIAGNOSIS  
Cervical material,   
(ThinPrep vial,   
Imaging-assisted   
review):  
Specimen Adequacy:  
Satisfactory for   
evaluation.  
-Endocervical/mcdowell  
sformation zone   
component is   
absent.  
Descriptive   
Diagnosis:  
Atypical squamous   
cells of   
undetermined   
significance   
(ASC-US).  
  
Cytotech Screener:   
ARMANI  
**Electronically   
Signed Out**  
Montez Kumar M.D.  
reanna/2023  
Procedure/Addendum  
HPV Procedure   
Report  
High Risk HPV   
testing was   
ordered Date   
Ordered: 2023  
Status: Ordered  
Source of   
Specimen:  
A: Cervical   
material,   
(ThinPrep vial,   
Imaging-assisted   
review)  
HPV   
Reflex?...........  
...........HPV if   
Abnormal  
Clinical History  
Postmenopausal  
Z01.419 Routine   
gyn exam without   
abnormal findings  
Processing Lab:   
[address]   
[id number]  
Interpretation   
performed at [address]   
[id number]  
The Pap smear is a   
screening test   
primarily for   
squamous   
epithelial  
lesions, which is   
subject to both   
false negative and   
false positive  
results. Your   
patient should be   
reminded to   
consult you   
immediately if  
she experiences   
any suspicious   
signs or symptoms,   
regardless of her  
Pap smear result.  
GYNECOLOGIC   
CYTOLOGY REPORT  
Patient Name:   
BURTON RAMÍREZ  
[facility] Rec: [id number]  
[facility]  
CONS[facility]   
[facility]   
[facility]  
ANATOMIC PATHOLOGY  
[address] [place] [phone]  
Fax: [phone]            Normal                                  [facility]  
   
                                        Comment on above:   Performed By: #### P  
PPVP ####  
[facility]  
[address] [place] [id number] [phone]  
: Kalpesh Wooten MD   
   
                                                    CBC AUTO DIFFon 2022   
   
                      BASO #     0.0 103/ul Normal     0.0-0.1    [facility]  
   
                                        Comment on above:   Performed By: #### C  
BC ####  
[facility]  
[address] [place], [place] [address]  
Dr. Xuan Westfall   
   
                                                    Basophils/100 WBC   
(Bld)           0.7 %           Normal          0.2-2.0         [facility]  
   
                                        Comment on above:   Performed By: #### C  
BC ####  
[facility]  
[address] [place] [address]  
Dr. Xuan Westfall   
   
                      EO #       0.1 103/ul Normal     0.0-0.7    [facility]  
   
                                        Comment on above:   Performed By: #### C  
BC ####  
[facility]  
[address] [place] [address]  
Dr. Xuan Westfall   
   
                                                    Eosinophils/100 WBC   
(Bld)           2.4 %           Normal          0.9-7.0         [facility]  
   
                                        Comment on above:   Performed By: #### C  
BC ####  
[facility]  
[address] [place] [address]  
Dr. Xuan Westfall   
   
                                                    Erythrocyte   
distribution width   
(RBC) [Ratio]   12.5 %          Normal          11.0-15.0       [facility]  
   
                                        Comment on above:   Performed By: #### C  
BC ####  
[facility]  
[address] [place] [address]  
Dr. Xuan Westfall   
   
                                                    Hematocrit (Bld)   
[Volume fraction] 40.5 %          Normal          36.0-48.0       [facility]  
   
                                        Comment on above:   Performed By: #### C  
BC ####  
[facility]  
[address] [place] [address]  
Dr. Xuan Westfall   
   
                                                    Hemoglobin (Bld)   
[Mass/Vol]      13.6 g/dL       Normal          12.0-16.0       [facility]  
   
                                        Comment on above:   Performed By: #### C  
BC ####  
[facility]  
[address] [place] [address]  
Dr. Xuan Westfall   
   
                      IG #       0.01 10e3/ul Normal     0.00-0.03  [facility]  
   
                                        Comment on above:   Performed By: #### C  
BC ####  
[facility]  
[address] [place] [address]  
Dr. Xuan Westfall   
   
                      IG %       0.2 %      Normal     0.0-0.5    [facility]  
   
                                        Comment on above:   Performed By: #### C  
BC ####  
[facility]  
[address] [place] [address]  
Dr. Xuan Westfall   
   
                      LYMPH #    1.3 103/ul Normal     1.2-3.8    [facility]  
   
                                        Comment on above:   Performed By: #### C  
BC ####  
[facility]  
[address] [place] [address]  
Dr. Xuan Westfall   
   
                                                    Lymphocytes/100 WBC   
(Bld)           23.3 %          Normal          20.5-60.0       [facility]  
   
                                        Comment on above:   Performed By: #### C  
BC ####  
[facility]  
[address] [place] [address]  
Dr. Xuan Westfall   
   
                      MANUAL DIFF REQ NO         Normal                [facility]  
   
                                        Comment on above:   Performed By: #### C  
BC ####  
[facility]  
[address] [place] [address]  
Dr. Xuan Westfall   
   
                                                    MCH (RBC) [Entitic   
mass]           31.2 pg         Normal          26.7-34.0       [facility]  
   
                                        Comment on above:   Performed By: #### C  
BC ####  
[facility]  
[address] [place] [address]  
Dr. Xuan Westfall   
   
                      MCHC (RBC) [Mass/Vol] 33.6 g/dL  Normal     29.9-35.2  [facility]  
 [facility]  
   
                                        Comment on above:   Performed By: #### C  
BC ####  
[facility]  
[address] [place] [address]  
Dr. Xuan Westfall   
   
                                                    MCV (RBC) [Entitic   
vol]            92.9 fL         Normal          81.0-99.0       [facility]  
   
                                        Comment on above:   Performed By: #### C  
BC ####  
[facility]  
[address] [place] [address]  
Dr. Xuan Westfall   
   
                      MONO #     0.5 103/ul Normal     0.3-0.8    [facility]  
   
                                        Comment on above:   Performed By: #### C  
BC ####  
[facility]  
[address] [place] [address]  
Dr. Xuan Westfall   
   
                                                    Monocytes/100 WBC   
(Bld)           9.0 %           Normal          1.7-12.0        [facility]  
   
                                        Comment on above:   Performed By: #### C  
BC ####  
[facility]  
[address] [place] [address]  
Dr. Xuan Westfall   
   
                      NEUT #     3.5 103/ul Normal     1.4-6.5    [facility]  
   
                                        Comment on above:   Performed By: #### C  
BC ####  
[facility]  
[address] [place] [address]  
Dr. Xuan Westfall   
   
                                                    Neutrophils/100 WBC   
(Bld)           64.4 %          Normal          43.0-75.0       [facility]  
   
                                        Comment on above:   Performed By: #### C  
BC ####  
[facility]  
[address] [place] [address]  
Dr. Xuan Westfall   
   
                                                    Platelet mean volume   
(Bld) [Entitic vol] 9.6 fL          Normal          9.5-13.5        [facility]  
   
                                        Comment on above:   Performed By: #### C  
BC ####  
[facility]  
[address] [place] [address]  
Dr. Xuan Westfall   
   
                      PLT        290 103/ul Normal     150-450    [facility]  
   
                                        Comment on above:   Performed By: #### C  
BC ####  
[facility]  
[address] [place] [address]  
Dr. Xuan Westfall   
   
                      RBC        4.36 106/ul Normal     4.20-5.40  [facility]  
   
                                        Comment on above:   Performed By: #### C  
BC ####  
[facility]  
[address] [place] [address]  
Dr. Xuan Westfall   
   
                      WBC        5.5 103/ul Normal     4.0-11.0   [facility]  
   
                                        Comment on above:   Performed By: #### C  
BC ####  
[facility]  
[address] [place] [address]  
Dr. Xuan Westfall   
   
                                                    FREE T3on 2022   
   
                      FREE T3    2.22 pg/mlL Normal     2.18-3.98  [facility]  
   
                                        Comment on above:   Performed By: #### C  
MP, TSH, FT3, LIPID ####  
[facility]  
[address] [place] [address]  
Dr. Xuan Westfall   
   
                                                    FREE T4on 2022   
   
                      Free T4 [Mass/Vol] 0.94 ng/dL Normal     0.76-1.46  [facility]  
   
                                        Comment on above:   Performed By: #### F  
T4 ####  
[facility]  
[address] [place] [address]  
Dr. Xuan Westfall   
   
                                                    LIPID PROFILEon 2022   
   
                      CHOL-HDL RATIO NORM SEE BELOW  Normal                [facility]  
   
                                        Comment on above:   Result Comment: 3.3   
- 4.4 LOW RISK 4.4 - 7.1 AVERAGE RISK 7.1   
-   
11.0 MODERATE RISK >11.0 HIGH RISK   
   
                                                            Performed By: #### C  
MP, TSH, FT3, LIPID ####  
[facility]  
[address] [place] [address]  
Dr. Xuan Westfall   
   
                      Cholesterol [Mass/Vol] 186 mg/dL  Normal     <=200      [facility]  
[facility]  
   
                                        Comment on above:   Performed By: #### C  
MP, TSH, FT3, LIPID ####  
[facility]  
[address] [place] [address]  
Dr. Xuan Westfall   
   
                                                    Cholesterol in HDL   
[Mass/Vol]      86 mg/dL        Critically high 40-60           [facility]  
   
                                        Comment on above:   Performed By: #### C  
MP, TSH, FT3, LIPID ####  
[facility]  
[address] [place] [address]  
Dr. Xuan Westfall   
   
                                                    Cholesterol in LDL   
[Mass/Vol]      89.8 mg/dL      Normal                          [facility]  
   
                                        Comment on above:   Performed By: #### C  
MP, TSH, FT3, LIPID ####  
[facility]  
[address] [place] [address]  
Dr. Xuan Westfall   
   
                                                    Cholesterol.total/Chol  
esterol in HDL [Mass   
ratio]          2.2 {ratio}     Normal                          [facility]  
   
                                        Comment on above:   Performed By: #### C  
MP, TSH, FT3, LIPID ####  
[facility]  
[address] [place] [address]  
Dr. Xuan Westfall   
   
                                        HDL NORMAL          > or = 60 mg/dl -   
LOW CARDIOVASCULAR   
RISK <40 mg/dl -   
HIGH   
CARDIOVASCULAR   
RISK                Normal                                  [facility]  
   
                                        Comment on above:   Performed By: #### C  
MP, TSH, FT3, LIPID ####  
[facility]  
[address] [place] [address]  
Dr. Xuan Westfall   
   
                      LDL CALC NORMAL SEE BELOW  Normal                [facility]  
   
                                        Comment on above:   Result Comment: <100  
 mg/dl OPTIMAL 100 - 129 mg/dl NEAR OR   
ABOVE OPTIMAL 130 - 159 mg/dl BORDERLINE HIGH 160 - 189 mg/dl   
HIGH >190 mg/dl VERY HIGH   
   
                                                            Performed By: #### C  
MP, TSH, FT3, LIPID ####  
[facility]  
[address] [place] [address]  
Dr. Xuan Westfall   
   
                                                    Triglyceride   
[Mass/Vol]      51 mg/dL        Normal          <=150           [facility]  
   
                                        Comment on above:   Performed By: #### C  
MP, TSH, FT3, LIPID ####  
[facility]  
[address] [place] [address]  
Dr. Xuan Westfall   
   
                      VLDL CALC  10.2 mg/dL Normal                [facility]  
   
                                        Comment on above:   Performed By: #### C  
MP, TSH, FT3, LIPID ####  
[facility]  
[address] [place] [address]  
Dr. Xuan Westfall   
   
                                                    PROF 14(COMP METB)on   
022   
   
                      Albumin [Mass/Vol] 3.8 g/dL   Normal     3.4-5.0    [facility]  
   
                                        Comment on above:   Performed By: #### C  
MP, TSH, FT3, LIPID ####  
[facility]  
[address] [place] [address]  
Dr. Xuan Westfall   
   
                                                    Albumin/Globulin [Mass   
ratio]          1.2 {ratio}     Normal                          [facility]  
   
                                        Comment on above:   Performed By: #### C  
MP, TSH, FT3, LIPID ####  
[facility]  
[address] [place] [address]  
Dr. Xuan Westfall   
   
                                                    ALP [Catalytic   
activity/Vol]   68 U/L          Normal                    [facility]  
   
                                        Comment on above:   Performed By: #### C  
MP, TSH, FT3, LIPID ####  
[facility]  
[address] [place] [address]  
Dr. Xuan Westfall   
   
                                                    ALT [Catalytic   
activity/Vol]   22 U/L          Normal          14-59           [facility]  
   
                                        Comment on above:   Performed By: #### C  
MP, TSH, FT3, LIPID ####  
[facility]  
[address] [place] [address]  
Dr. Xuan Westfall   
   
                      Anion gap [Moles/Vol] 10.1 mmol/L Normal                [facility]  
   
                                        Comment on above:   Performed By: #### C  
MP, TSH, FT3, LIPID ####  
[facility]  
[address] [place] [address]  
Dr. Xuan Westfall   
   
                                                    AST [Catalytic   
activity/Vol]   18 U/L          Normal          15-37           [facility]  
   
                                        Comment on above:   Performed By: #### C  
MP, TSH, FT3, LIPID ####  
[facility]  
[address] [place] [address]  
Dr. Xuan Westfall   
   
                      Bilirubin [Mass/Vol] 0.8 mg/dL  Normal     0.2-1.0    [facility]  
   
                                        Comment on above:   Performed By: #### C  
MP, TSH, FT3, LIPID ####  
[facility]  
[address] [place] [address]  
Dr. Xuan Westfall   
   
                      Calcium [Mass/Vol] 9.0 mg/dL  Normal     8.5-10.1   [facility]  
   
                                        Comment on above:   Performed By: #### C  
MP, TSH, FT3, LIPID ####  
[facility]  
[address] [place] [address]  
Dr. Xuan Westfall   
   
                      Chloride [Moles/Vol] 104 mmol/L Normal          [facility]  
   
                                        Comment on above:   Performed By: #### C  
MP, TSH, FT3, LIPID ####  
[facility]  
[address] [place] [address]  
Dr. Xuan Westfall   
   
                      CO2 [Moles/Vol] 30.7 mmol/L Normal     21.0-32.0  [facility]  
   
                                        Comment on above:   Performed By: #### C  
MP, TSH, FT3, LIPID ####  
[facility]  
[address] [place] [address]  
Dr. Xuan Westfall   
   
                      Creatinine [Mass/Vol] 0.81 mg/dL Normal     0.55-1.02  [facility]  
   
                                        Comment on above:   Performed By: #### C  
MP, TSH, FT3, LIPID ####  
[facility]  
[address] [place] [address]  
Dr. Xuan Westfall   
   
                      EGFR-AF AMERICAN >60        Normal     >=60       [facility]  
   
                                        Comment on above:   Performed By: #### C  
MP, TSH, FT3, LIPID ####  
[facility]  
[address] [place] [address]  
Dr. Xuan Westfall   
   
                      EGFR-NON AF AMERICAN >60        Normal     >=60       [facility]  
   
                                        Comment on above:   Performed By: #### C  
MP, TSH, FT3, LIPID ####  
[facility]  
[address] [place] [address]  
Dr. Xuan Westfall   
   
                                                    Globulin (S)   
[Mass/Vol]      3.2 g/dL        Normal                          [facility]  
   
                                        Comment on above:   Performed By: #### C  
MP, TSH, FT3, LIPID ####  
[facility]  
[address] [address]  
Dr. Xuan Westfall   
   
                      Glucose [Mass/Vol] 86 mg/dL   Normal          [facility]  
   
                                        Comment on above:   Performed By: #### C  
MP, TSH, FT3, LIPID ####  
[facility]  
[address] [address]  
Dr. Xuan Westfall   
   
                      Potassium [Moles/Vol] 3.8 mmol/L Normal     3.5-5.1    [facility]  
   
                                        Comment on above:   Performed By: #### C  
MP, TSH, FT3, LIPID ####  
[facility]  
[address] [place] [address]  
Dr. Xuan Westfall   
   
                      Protein [Mass/Vol] 7.0 g/dL   Normal     6.4-8.2    [facility]  
   
                                        Comment on above:   Performed By: #### C  
MP, TSH, FT3, LIPID ####  
[facility]  
[address] [place] [address]  
Dr. Xuan Westfall   
   
                      Sodium [Moles/Vol] 141 mmol/L Normal     136-145    [facility]  
   
                                        Comment on above:   Performed By: #### C  
MP, TSH, FT3, LIPID ####  
[facility]  
[address] [place] [address]  
Dr. Xuan Westfall   
   
                                                    Urea nitrogen   
[Mass/Vol]      15.0 mg/dL      Normal          7.0-18.0        [facility]  
   
                                        Comment on above:   Performed By: #### C  
MP, TSH, FT3, LIPID ####  
[facility]  
[address] [place] [address]  
Dr. Xuan Westfall   
   
                                                    Urea   
nitrogen/Creatinine   
[Mass ratio]    18.5 mg/mg      Normal                          [facility]  
   
                                        Comment on above:   Performed By: #### C  
MP, TSH, FT3, LIPID ####  
[facility]  
[address] [place] [address]  
Dr. Xuan Westfall   
   
                                                    TSHon 2022   
   
                      TSH        2.843 uIU/mL Normal     0.358-3.740 [facility]  
   
                                        Comment on above:   Performed By: #### C  
MP, TSH, FT3, LIPID ####  
[facility]  
[address] [address]  
Dr. Xuan Westfall   
   
                                                    HAND RIGHT 3 VWSon 10-  
9   
   
                                        HAND RIGHT 3 VWS    [facility]  
Department of   
Radiology  
[address] [place]   
[address] [phone]  
  
  
  
==================  
==  
Patient Name:   
BURTON RAMÍREZ  
: 1960  
Sex: F  
Age:  
Race: White  
MRN: [id number]  
Pt. Location:   
Patient Status: O  
Visit #:   
[id number]  
Ordered Date:   
10/16/2019   
10:40:00 AM  
Completed Date:   
10/16/2019 10:36   
AM  
Requesting   
Provider:   
ANITA REILLY  
Attending   
Provider:   
ANITA REILLY  
Report Copy To:  
Signs & Symptoms:   
S67.190A Crushing   
injury of right   
index finger,   
initial encounter   
I10  
History: [place]  
Comments: , Views   
(X-RAY, HAND): PA,   
Lateral, Oblique ,   
Weight Bearing?: N   
, Views (X-RAY,   
HAND): PA,   
Lateral, Oblique ,   
Weight Bearing?: N   
, , ========== ,   
Ordering Provider   
- ANITA REILLY MD , ==========  
Exam: HAND RIGHT 3   
VWS  
Accession #:   
[id number]  
==================  
==  
HAND RIGHT 3 VWS   
10/16/2019 10:36   
AM EDT  
  
SIGNS AND   
SYMPTOMS: S67.190A   
Crushing injury of   
right index   
finger,  
initial encounter   
I10  
  
TECHNOLOGIST   
COMMENTS: rt 2nd   
digit injury 2   
weeks ago  
  
QUESTION FOR THE   
RADIOLOGIST: ,   
Views (X-RAY,   
HAND): PA,   
Lateral,  
Oblique , Weight   
Bearing?: N ,   
Views (X-RAY,   
HAND): PA,   
Lateral,  
Oblique , Weight   
Bearing?: N , ,   
========== ,   
Ordering Provider   
-  
ANITA REILLY MD   
, ==========  
  
PROTOCOL:   
AP,Lateral and   
Oblique views were   
obtained.  
  
COMPARISON: None  
  
FINDINGS:  
  
Soft tissues:  
Mild swelling  
  
Bones:  
No fracture  
  
Joints:  
Mild arthritis   
diffusely  
Pronounced   
spurring at the   
dorsal aspect of   
the long finger   
DIP joint  
  
  
  
IMPRESSION:  
  
1. No acute   
fracture  
2. Apparent   
flexion deformity   
at the index   
finger DIP joint  
3. Scattered   
spurring, most   
pronounced at the   
long finger DIP   
joint  
dorsally  
  
Electronically   
signed by:Rudi Oden.  
  
  
  
Transcribed by:   
[email or web address], User  
Resident:  
Electronically   
Signed by: RUDI ODEN @ 10/16/2019   
01:16 PM            Normal                                  [facility]  
   
                                        Comment on above:   Order Comment: , Vie  
ws (X-RAY, HAND): PA, Lateral, Oblique ,   
Weight Bearing?: N , Views (X-RAY, HAND): PA, Lateral, Oblique   
, Weight Bearing?: N , , ========== , Ordering Provider - ANITA REILLY MD , ==========   
  
  
  
Vital Signs  
  
  
                      Date Time  Vital Sign Value      Performing Clinician Santy fonseca  
   
                                                    2024   
14:                              Blood Pressure   
Location                                            [facility]  
Work Phone:   
[phone]                           [facility]   
[facility]  
   
                                                    2024   
14:                              Diastolic blood   
pressure                  84 mm[Hg]                 Jeovanny NILL  
Work Phone:   
[phone]                           [facility]   
Given  
   
                                                    2024   
14:          Heart rate          70 /min             Jeovanny SkyriderL  
Work Phone:   
[phone]                           [facility]   
Given  
   
                                                    2024   
14:          Respiratory rate    16 /min             Jeovanny VALENTINOL  
Work Phone:   
[phone]                           [facility]   
Given  
   
                                                    2024   
14:                              Systolic blood   
pressure                  122 mm[Hg]                Jeovanny NILL  
Work Phone:   
[phone]                           [facility]   
[place]  
  
  
  
Encounters  
  
  
                          Encounter Date Encounter Type Care Provider Facility  
   
                                                    Start: 2024  
End: 2024     ambulatory          Jeovanny ROWAN      [facility]  
   
                                                    Start: 2024  
End: 2024                         Patient encounter   
procedure                               Jeovanny ROWAN  
Work Phone:   
[phone]                           [facility]   
[facility]  
Work Phone:   
[phone]  
   
                          Start: 2024 ambulatory   FNP Agata L Schwab Facil  
ity:[facility]    
[facility]  
   
                          Start: 2024 ambulatory   FNP Agata L Schwab Facil  
ity:[facility]    
[facility]  
   
                                                    Start: 10-  
End: 10-     ambulatory          Jeovanny R HARPAL      [facility]  
   
                                                    Start: 10-  
End: 10-                         Patient encounter   
procedure                               Jeovanny ROWAN  
Work Phone:   
[phone]                           [facility]  
Work Phone:   
[phone]  
   
                          Start: 2024 ambulatory   FNP Agata Schwab Facilit  
y: [place]  
   
                          Start: 2024 ambulatory   FNP Agata Schwab Facilit  
y: [facility]  
   
                                                    Start: 2024  
End: 2024     ambulatory          FNP Agata L Schwab   Facility:[facility]   
[place]  
   
                                                    Start: 2024  
End: 2024           Lab Drop off              Agata L Schwab  
Work Phone:   
[phone]                           [facility]  
Work Phone:   
[phone]  
   
                                                    Start: 2024  
End: 2024     ambulatory          FNP Agata L Schwab   [place]  
   
                          Start: 2023 ambulatory   FNP Agata Schwab Facilit  
y:[facility]   
[place]  
   
                                                    Start: 2023  
End: 2023           Lab Drop off              Agata L Schwab  
Work Phone:   
[phone]                           [facility]  
Work Phone:   
[phone]  
   
                                                    Start: 2023  
End: 2023     ambulatory          MANUEL W HEDGES     [facility]  
   
                                        Start: 2023   Encounter for   
gynecological examination   
(general) (routine)   
without abnormal findings MANUEL FARRIS             [facility]  
   
                                                    Start: 2023  
End: 2023                         Patient encounter   
procedure                                           [facility]  
   
                                                    Start: 2023  
End: 2023                         Subsequent hospital visit   
by physician                                        [facility]  
   
                                        Comment on above:   Women's annual routi  
ne gynecological examination   
   
                                        Start: 2022   Encounter for genera  
l   
adult medical examination   
without abnormal findings DR MELINDA WILSON           [facility]  
   
                                                    Start: 2022  
End: 2022     ambulatory          DR MELINDA WILSON     Facility:[facility]  
   
                                                    Start: 2022  
End: 2022                         Encounter for general   
adult medical examination   
without abnormal findings DR MELINDA WILSON           Facility:[facility]  
   
                                                    Start: 2021  
End: 2021                         Subsequent hospital visit   
by physician              [facility]  
   
                                        Comment on above:   Women's annual routi  
ne gynecological examination   
  
  
  
Procedures  
  
  
                          Date         Procedure    Procedure Detail Performing   
Clinician  
   
                                        Start: 2021   Microscopic observat  
ion   
[Identifier] in Cervix by   
Cyto stain                                            
   
                                       Appendectomy              Jodi Schwab  
Work Phone:   
[phone]  
   
                                       Colonoscopy               Jeovanny NILL  
Work Phone:   
[phone]  
   
                                                            Decompression of med  
amelia   
nerve                                               Jeovanny NILL  
Work Phone:   
[phone]  
   
                                                            Decompression of med  
amelia   
nerve                                               Jeovanny NILL  
Work Phone:   
[phone]  
   
                                       Surgical procedure              Agata ixigo  
Work Phone:   
[phone]  
  
  
  
Plan of Treatment  
  
  
                          Date         Care Activity Detail       Author  
   
                                        Start: 2024   Screening for malign  
ant   
neoplasm of cervix                                  [facility]  
   
                                Start: 2023 Influenza vaccination Flu vacc  
ine (Season   
Ended)                                  [facility]  
   
                                        Start: 2023   Screening for malign  
ant   
neoplasm of colon                                   [facility]  
   
                                        Comment on above:   Postponed from  (Not Indicated)   
   
                                                            Postponed from  (Not Indicated)   
   
                                                            Postponed from    
   
                                        Start: 2022   Screening for malign  
ant   
neoplasm of cervix        Cervical cancer screen    [facility]  
Work Phone:   
[phone]  
   
                                        Start: 2021   COVID-19 Vaccine (3   
-   
Booster for Pfizer   
series)                                 COVID-19 Vaccine (3 -   
Booster for Pfizer   
series)                                 [facility]  
   
                                        Start: 2020   Screening for malign  
ant   
neoplasm of breast        Breast cancer screen      [facility]  
   
                          Start: 2020 Influenza vaccination Flu vaccine (#  
1) [facility] Phone:   
[phone]  
   
                                Start: 2012 Shingles Vaccine (1 of 2) Shin  
gles Vaccine (1 of   
2)                                      [facility] Phone:   
[phone]  
   
                                        Start: 2007   Screening for malign  
ant   
neoplasm of colon                                   [facility]  
   
                          Start: 2002 Diabetes screen Diabetes screen [facility]  
Work Phone:   
[phone]  
   
                          Start: 2002 Lipid panel               [facility]   
[place]  
   
                          Start: 1997 Diabetes screen Diabetes screen [facility]   
[place]  
   
                                        Start: 1992   Screening for malign  
ant   
neoplasm of cervix                      HPV (without or with   
Pap)                                    [facility]   
[place]  
   
                                        Start: 1981   DTaP/Tdap/Td vaccine  
 (1 -   
Tdap)                                   DTaP/Tdap/Td vaccine (1   
- Tdap)                                 [facility]   
[place]  
   
                          Start: 1980 Hepatitis C screening Hepatitis C [facility]   
[place]  
   
                          Start: 1978 COVID-19 Vaccine (1) COVID-19 Vaccin  
e (1) [facility] Phone:   
[phone]  
   
                          Start: 1977 HIV screening HIV screen   [facility]   
[place]  
   
                          Start: 1974 Depression Screen Depression Screen   
[facility]  
   
                          Start: 1962 Hepatitis C screening Hepatitis C [facility] Phone:   
[phone]  
   
                                                      
End: 2021                         Cytopathology procedure,   
preparation of smear,   
genital source                          [facility] Lab Routine   
Women's annual routine   
gynecological   
examination 1   
Occurrences starting   
2021 until   
2021                              [facility] Phone:   
[phone]  
   
                                        Comment on above:   1 Occurrences starti  
ng 2021 until 2021   
   
                                                      
End: 2023                         Cytopathology procedure,   
preparation of smear,   
genital source                          [facility] Lab Routine   
Women's annual routine   
gynecological   
examination 1   
Occurrences starting   
2023 until   
2023                              [facility]   
[place]  
Work Phone:   
[phone]  
   
                                        Comment on above:   1 Occurrences starti  
ng 2023 until 2023   
  
  
  
Immunizations  
  
  
                      Immunization Date Immunization Notes      Care Provider Lisbeth randolph  
   
                                        2021          SARS-CoV-2 (COVID-19  
)   
mRNA BNT-162b2 vax                                  Jeovanny NILL  
Work Phone:   
[phone]                           [facility]  
   
                                        Comment on above:   Result Comment: 2024  
-10-24: [id number]   
   
                                        2021          SARS-CoV-2 (COVID-19  
)   
mRNA BNT-162b2 vax                                  [facility]  
Work Phone:   
[phone]                           [facility]  
   
                                        Comment on above:   Result Comment: 2024  
-10-24: [id number]   
  
  
  
Payers  
  
  
                          Date         Payer Category Payer        Policy ID  
   
                          2016   Unknown                   [id number] [id number]  
[id number]  
   
                          1960   Unknown                   [id number] [id number]  
[id number]  
   
                          1960   Unknown                   [id number] [id number]  
[id number]  
   
                          1960   Unknown                   [id number] 2.16.8  
[id number]  
   
                          1960   Unknown                   [id number] [id number]  
[id number]  
   
                          1960   Unknown                   [id number] 2.16.8  
[id number]  
   
                          1960   Unknown                   [id number] [id number]  
[id number]  
   
                          1960   Unknown                   [id number] [id number]  
[id number]  
   
                          1960   Unknown                   [id number] [id number]  
[id number]  
   
                          1960   Unknown                   [id number] [id number]  
[id number]  
   
                          1958   Unknown                   [id number] [id number]  
[id number]  
  
  
  
Social History  
  
  
                          Date         Type         Detail       Facility  
   
                                                    Start: 2016  
End: 2021                         Tobacco smoking status   
NHIS                      Never smoker              [facility]  
   
                                                    Start: 2016  
End: 2021                         Tobacco use and   
exposure                  Never used                [facility] Phone:   
[phone]  
   
                          Start: 2021 Alcohol intake Not Asked    [facility] Phone:   
[phone]  
   
                          Start: 1960 Sex Assigned At Birth Not on file  M  
[facility] Phone:   
[phone]  
   
                          Start: 2023 Alcohol intake Ex-drinker (finding)   
[employer] Phone:   
[phone]  
   
                                                Tobacco smoking status No Smokin  
g Status   
Entered                                 [facility]  
   
                                       Sex Assigned At Birth Female       [facility]  
   
                                                    Start: 11-  
End: 2024     Tobacco smoking status Ex-smoker (finding) [facility]  
   
                                        Comment on above:   quit    
   
                                       Tobacco smoking status Never        [facility]  
   
                                        Comment on above:   quit    
  
  
  
Functional Status  
  
  
                          Date         Assessment   Result       Facility  
   
                          2024   Functional Status N/A          [facility]   
[facility]  
  
  
  
Clinical Note 2024  
  
  
                                Note Date & Type Note            Facility  
   
                                        2024 Note     General Surgery Offi  
ce/Clinic Note  
Chief Complaint  
consultation for colonoscopy  
HPI Staff  
64 year old female presents on   
consultation from Jodi Schwab for   
screening colonoscopy. Denies abdominal   
or rectal pain. No rectal bleeding or   
change in bowel habits. Denies nausea   
or vomiting. No unexplained weight   
loss. Previous colonoscopy in 2014-   
operative report not available,   
reported normal per patient. Mother   
with history of colon cancer, diagnosed   
age 73.  
History of Present Illness  
65 yo female with h/o hypothyroidism,   
referred for colorectal screening;   
denies change in bms or blood in   
stools; no abdominal complaints; denies   
asa use, on Meloxicam prn, no SBE   
prophylaxis; abdominal operations   
significant for appendectomy, last   
colonoscopy , report unavailable;   
fmhx of colon cancer in patient's   
mother, dx in her 70's, no Fmhx of IBD;   
no tobacco use.  
Review of Systems  
PHQ Score  
Initial Depression Screen Score: 0   
SCORE  
ROS - Provider  
Constitutional: no fever, no sweats, no   
weight loss.  
Eyes: no glasses, no blurred vision, no   
visual loss.  
ENMT: no dentures, no hoarseness, no   
swallowing difficulties, no hearing   
loss, no ear infection(s), no nose   
bleeds.  
Cardiovascular: normal blood pressure,   
no chest pain, regular heartbeat, no   
heart murmur.  
Respiratory: no shortness of breath, no   
cough, no asthma, no wheezing.  
Gastrointestinal: no nausea, no   
vomiting, no diarrhea, no constipation,   
no blood in stool, no change in bowel   
habits, no abdominal pain, no   
hepatitis.  
Genitourinary: no kidney stones, no   
urine infection, no dysuria.  
Musculoskeletal: no pain, no weakness.  
Skin: no changing moles, no rash, no   
skin lumps.  
Neurologic: no seizures, no epilepsy,   
no headache.  
Psychiatric: no emotional or   
psychiatric problem.  
Heme/Lymph: no bleeding problems, no   
anemia, no blood clots, no   
transfusions.  
Allergy/Immunologic: no swollen lymph   
nodes/glands, no IV drug abuse.  
Other:  
Additional ROS info: Except as noted in   
the above Review of Systems and in the   
History of Present Illness, all other   
systems have been reviewed and are   
negative or noncontributory.  
  
Physical Exam  
Vitals & Measurements  
HR: 70(Peripheral) RR: 16 BP: 122/84  
HT: 60 in HT: 152.8 cm WT: 73.6 kg WT:   
162.26 lb BMI: 31.52  
HEENT: normal conjunctiva, sclera   
clear, no scleral icterus, EOM intact,   
PERRLA, oral mucosa moist without   
lesions.  
Neck: trachea midline, no mass,   
symmetric, no thyromegaly or nodules,   
no adenopathy  
Respiratory: lungs CTA, respirations   
non labored.  
Cardiovascular: regular rate and   
rhythm, no murmur, no pedal edema or   
varicosities.  
Gastrointestinal: soft, non distended,   
no tenderness, no masses, no palpable   
hernias, diastasis recti no, no   
hepatosplenomegaly; normal bs  
Lymphatic: no cervical adenopathy, no   
supraclavicular adenopathy.  
Musculoskeletal: normal gait, digits   
and nails without infection, nodes,   
cyanosis, clubbing.  
Skin: no rashes, no lesions, no ulcers,   
no subcutaneous nodules, induration.  
Psychiatric/Neuro: oriented to time,   
place, person, judgement normal, affect   
appropriate for age, insight intact, no   
focal deficits.  
Tests: , review of old records   
completed ,  
Discussed surgical options, risks, and   
possible complications with patient.  
Assessment/Plan  
1. Screening for malignant neoplasm of   
colon (Z12.11: Encounter for screening   
for malignant neoplasm of colon)  
plan colonoscopy under anesthesia,   
informed consent obtained.  
Follow-up  
No qualifying data available  
Problem List/Past Medical History  
Ongoing  
BMI 31.0-31.9,adult  
Class 1 obesity due to excess calories   
with body mass index (BMI) of 30.0 to   
30.9 in adult  
Colon cancer screening  
Family history of colon cancer in   
mother  
Hypothyroidism  
Left foot pain  
Screening for malignant neoplasm of   
colon  
Wellness examination  
Historical  
No qualifying data  
Procedure/Surgical History  
Appendectomy, Carpal tunnel release,   
Colonoscopy.  
Medications  
levothyroxine 125 mcg (0.125 mg) Tab,   
125 mcg= 1 tab(s), Oral, Daily, 3   
refills  
meloxicam 15 mg Tab, See Instructions  
Allergies  
No Known Allergies  
Social History  
Alcohol  
Current. Wine. 1-2 times per week.,   
2024  
Substance Abuse  
Never., 2024  
Tobacco  
Former smoker, quit more than 30 days   
ago Tobacco Use:. Never Smokeless   
Tobacco Use:. Cigarettes, 1 per day.   
Started age 21.0 Years. Stopped age 34   
Years., 2024  
Family History  
Primary malignant neoplasm of colon:   
Mother.  
Primary malignant neoplasm of prostate:   
Father.  
Immunizations  
Vaccine Date Status Comments  
SARS-CoV-2 (COVID-19) mRNA BNT-162b2   
vax 2021 Recorded 2024-10-24:   
TPV50  
SARS-CoV-2 (COVID-19) mRNA BNT-162b2   
vax 2021 Recorded 2024-10-24:   
TPV50                                   [facility]  
   
                                        Comment on above:   Result Comment: Elec  
tronically Signed By: HARPAL LUJAN, Jeovanny Amos\Date and Time Signed: 24 15:04 EST   
  
  
  
Evaluation + Plan note 2024  
                                   Laboratory  
  
                                Note Date & Type Note            Facility  
   
                                                    2024 Evaluation + Plan  
   
note                                      
  
  
Future Scheduled TestsCBC w/   
Auto Diff 24Comprehensive   
Metabolic Panel 24Lipid   
Panel 24Thyroid   
Stimulating Hormone 24  
  
                                        [facility]  
Work Phone: [phone]  
  
  
  
Evaluation + Plan note  
  
  
                                Note Date & Type Note            Facility  
   
                                        Evaluation + Plan note   
  
  
Future Appointments  
  
  
Appointment Date:11/15/2023   
03:20:00 PM  
Scheduled Provider:Schwab FNP, Jodi L  
Location:[facility]  
Appointment Type:[facility]  
  
  
  
Evaluation + Plan note  
                                   Laboratory  
  
                                Note Date & Type Note            Facility  
   
                                        Evaluation + Plan note   
  
  
Future Appointments  
  
  
Appointment Date:2024   
03:20:00 PM  
Scheduled Provider:  
Location:[facility] [place]  
Appointment Type: Nurse Visit  
  
  
  
Future Scheduled TestsCBC w/ Auto   
Diff 24Comprehensive   
Metabolic Panel 24Lipid   
Panel 24Thyroid Stimulating   
Hormone 24  
  
                                        [facility]  
Work Phone: [phone]  
  
  
  
Evaluation note  
  
  
                                Note Date & Type Note            Facility  
  
  
  
                                                    Evaluation note   
  
  
  
                                                    Diagnosis  
   
                                                      
  
  
Women's annual routine gynecological examination  
  
documented in this encounter  
[facility]  
Work Phone: [phone]  
  
Hospital course Narrative  
  
  
                                Note Date & Type Note            Facility  
   
                                        Hospital course Narrative   
  
  
No data available for this   
section                                 [facility]  
  
  
  
Hospital Discharge instructions  
  
  
                                Note Date & Type Note            Facility  
   
                                        Hospital Discharge instructions   
  
  
No data available for this   
section                                 [facility]  
  
  
  
Progress note  
  
  
                                Note Date & Type Note            Facility  
   
                                        Progress note         
  
  
No data available for this section      [facility]  
  
  
  
Summary Purpose  
  
  
                                                      
  
  
  
Family History  
No Family History Records FoundNo Family History Records FoundNo Family History 
Records Found  
  
No data available for this section  
  
  
  
No data available for this section  
  
  
  
No data available for this section  
  
  
  
No data available for this section  
  
No Family History Records Found  
  
Advance Directives  
No Advanced Directives Records FoundDocuments on File  
  
                          Type         Date Recorded Patient Representative Expl  
anation  
   
                          ACP-Advance Directive                             
   
                          ACP-Power of                              
  
  
  
Assessments  
  
  
                                                    Diagnosis  
   
                                                      
  
  
Women's annual routine gynecological examination  
  
  
  
Additional Source Comments  
  
  
  
                                                    INFORMATION SOURCE (unrecogn  
ized section and content)  
   
                                          
  
  
  
                                        DATE CREATED        AUTHOR  
   
                                10/28/2019                      [facility]  
  
  
  
                                DATE CREATED    AUTHOR          AUTHOR'S ORGANIZ  
ATION  
   
                                2022                      [facility]  
pital  
  
  
  
                                DATE CREATED    AUTHOR          AUTHOR'S ORGANIZ  
ATION  
   
                                2023                      [facility]  
p[facility]  
  
  
  
                                DATE CREATED    AUTHOR          AUTHOR'S ORGANIZ  
ATION  
   
                                2024                      [facility]  
  
  
  
  
  
                                                    Patient Care team informatio  
n (unrecognized section and content)  
   
                                                      
  
  
Personnel  
  
  
Name: Schwab FNP, Jodi L  
Address:  
Address: [address] [place] [address]-  
  
  
  
Personnel  
  
  
Name: Schwab FNP, Jodi L  
Address:  
Address: [address] [place] [address]-  
  
  
  
Personnel  
  
  
Name: Schwab FNP, Jodi L  
Address:  
Address: [address] [place] [address]-  
  
  
  
Personnel  
  
  
Name: Schwab FNP, Jodi L  
Address:  
Address: [address] [place] [address]-  
  
  
FOR RECORDS PERTAINING TO PATIENTS WHO ARE OR HAVE BEEN ENROLLED IN A CHEMICAL 
DEPENDENCY/SUBSTANCEABUSE PROGRAM, SOME INFORMATION MAY BE OMITTED. This 
clinical summary was aggregated from multiple sources. Caution should be 
exercised in using it in the provision of clinical care. This summary normalizes
 information from multiple sources, and as a consequence, information in this 
document may materially change the coding, format and clinical context of 
patient data. In addition, data may be omitted in some cases. CLINICAL DECISIONS
 SHOULD BE BASED ON THE PRIMARY CLINICAL RECORDS. [facility]. provides
 no warranty or guarantee of the accuracy or completeness of information in this
 document.

## 2025-01-15 NOTE — OP_ITS
OPERATION DATE:   01/15/2025  
  
PREOPERATIVE DIAGNOSIS:  Colorectal screening.  
  
POSTOPERATIVE DIAGNOSIS:  Normal colonoscopy to cecum.  
  
PROCEDURE:  Colonoscopy to cecum.  
  
SURGEON:  Jeovanny Thompson M.D.   
  
ANESTHESIA:  Monitored anesthesia care.  
  
ESTIMATED BLOOD LOSS:  Zero.  
  
INDICATIONS AND CONSENT:  Patient is a 64-year-old female presents for 
colorectal screening.  Indications, risks, benefits, alternatives of proceeding 
with colonoscopy were explained extensively to the patient, including the risks 
of bleeding, colon perforation or anesthetic complications.  All of her 
questions were answered.  Informed consent was obtained.  
  
PROCEDURE:  Patient brought to the operating room, placed in the left lateral 
decubitus position.  Monitored anesthesia care was provided.  Rectal exam was 
performed which showed no masses or blood.  The scope was inserted into the anal
canal.  Under direct visualization was advanced.  It was advanced to the cecum 
where cecal markings were clearly identified.  There was noted to be a good 
prep.  Upon withdrawal of the scope, mucosal surfaces were carefully examined.  
There were no mass lesions or polyps.  No inflammatory changes or ulcerations.  
No significant diverticulosis.  The scope was retroflexed in the anal canal.  
There was no significant hemorrhoidal disease.  The scope was then withdrawn.  
Patient tolerated procedure well, was sent to recovery room in good condition.  
  
Follow up colonoscopy should be in 10 years for screening.  
  
CC:  Jodi Schwab, FNP-C MTDD

## 2025-03-07 ENCOUNTER — HOSPITAL ENCOUNTER
Dept: HOSPITAL 101 - MAMMO | Age: 65
Discharge: HOME | End: 2025-03-07
Payer: COMMERCIAL

## 2025-03-07 DIAGNOSIS — Z12.31: Primary | ICD-10-CM

## 2025-03-07 DIAGNOSIS — Z80.42: ICD-10-CM

## 2025-03-07 DIAGNOSIS — Z80.0: ICD-10-CM

## 2025-03-07 PROCEDURE — 77067 SCR MAMMO BI INCL CAD: CPT

## 2025-03-07 PROCEDURE — 77063 BREAST TOMOSYNTHESIS BI: CPT

## 2025-03-07 NOTE — XMS_ITS
Comprehensive CCD (C-CDA v2.1)  
  
                            Created on: 2025  
  
  
BURTON RAMÍREZ  
External Reference #: CDR,PersonID:[id number]  
: 1960  
Sex: Female  
  
Demographics  
  
  
                                        Address             [address] [place] [address]  [id number]  
   
                                        Home Phone          [phone]  
   
                                        Home Phone          [phone]  
   
                                        Work Phone          [phone]  
   
                                        Preferred Language  en  
   
                                        Marital Status        
   
                                        Worship Affiliation Unknown  
   
                                        Race                White  
   
                                        Ethnic Group        Not  or Lati  
no  
  
  
Author  
  
  
                                        Organization        [facility]  
  
  
Care Team Providers  
  
  
                                Care Team Member Name Role            Phone  
   
                                Melinda Wilson   Primary Care Provider [phone]  
   
                                DENG, DR MELINDA OWUSU Admitting       Unavailable  
   
                                DENG, DR MELINDA OWUSU Attending       Unavailable  
   
                                WILSON, DR MELINDA OWUSU Primary Care    Unavailable  
   
                                WILSON, DR MELINDA OWUSU Consulting      Unavailable  
   
                                Unavailable     Primary Care Provider UnavailMANUEL Rich Referring       Unavailable  
   
                                Schwab, Agata L  Primary Care Physician [phone]  
   
                                Schwab, FNP Agata L Attending       Unavailable  
   
                                Schwab, FNP Agata L Attending       Unavailable  
   
                                Schwab, FNP Agata L Attending       Unavailable  
   
                                Schwab, FNP Agata L Attending       Unavailable  
   
                                Schwab, FNP Agata L Admitting       Unavailable  
   
                                Schwab, FNP Agata L Referring       Unavailable  
   
                                NILL, Jeovanny WEBER Attending       Unavailable  
   
                                Schwab, FNP Agata L Referring       Unavailable  
   
                                NILL, Jeovanny WEBER Attending       Unavailable  
   
                                Schwab, FNP Agata L Attending       Unavailable  
  
  
  
Medications  
Current Medications  
  
  
  
                      Medication Drug Class(es) Dates      Sig (Normalized) Sig   
(Original)  
   
                                                    clobetasol   
propionate 0.5 mg/ml   
topical cream  
(2 sources)               Corticosteroid            Start:   
2023                                          clobetasol   
(TEMOVATE) 0.05 %   
cream Indications:   
Lichen sclerosus of   
female genitalia   
Apply a thin layer   
daily as needed ,   
not to exceed 6   
weeks at a time 30   
g 1 2023   
Active  
  
  
  
                                Start: 2021                 clobetasol (TE  
MOVATE) 0.05 % cream Indications: Lichen   
sclerosus   
of female genitalia Apply a thin layer daily as needed , not to   
exceed 6 weeks at a time 30 g 2021 Active  
  
  
  
                                                    levothyroxine sodium   
0.125 mg oral tablet  
(5 sources)         l-Thyroxine         Start: 2024   take 1 tablet   
by mouth once   
daily                                   levothyroxine 125   
mcg (0.125 mg) Tab   
125 mcg = 1 tab(s),   
Oral, Daily, # 90   
tab(s), Refills(s)   
3, Pharmacy:   
[facility]   
#[id number], 152.8, cm,   
11/15/23 15:35:00   
EST, Height/Length   
Dosing, 72.9, kg,   
11/15/23 15:35:00   
EST, Weight Dosing   
Start Date: 24   
Status: Ordered  
  
  
  
                                        Start: 2024   take 1 tablet by summer  
th once   
daily                                   levothyroxine 125 mcg (0.125 mg) Tab   
125 mcg = 1 tab(s), Oral, Daily, # 90   
tab(s), Refills(s) 0, Pharmacy:   
[id number], 152.8,   
cm, 11/15/23 15:35:00 EST,   
Height/Length Dosing, 72.9, kg,   
11/15/23 15:35:00 EST, Weight Dosing   
Start Date: 24 Status: Ordered  
   
                                        Start: 2019   take 1 tablet by summer  
 once   
daily                                   levothyroxine (SYNTHROID) 125 MCG   
tablet TK 1 T PO QD 3 2019   
Active  
  
  
  
                                                    liothyronine   
sodium 0.005 mg   
oral tablet  
(1 source)                l-Triiodothyronine        Start:   
2023                              take 1   
tablet by   
mouth once   
daily in the   
morning                                 liothyronine   
(CYTOMEL) 5 MCG   
tablet Take 1   
tablet by mouth   
every morning 0   
2023 Active  
   
                                                    meloxicam 15 mg   
oral tablet  
(2 sources)                             Nonsteroidal   
Anti-inflammatory Drug                  Start:   
2024                              take 1   
tablet by   
mouth once   
daily                                   meloxicam 15 mg   
Tab See   
Instructions,   
TAKE 1 TABLET BY   
MOUTH DAILY, # 30   
tab(s),   
Refills(s) 0,   
Pharmacy:   
[facility]   
[id number],   
152.8, cm,   
24 16:53:00   
EDT,   
Height/Length   
Dosing, 71.2, kg,   
24 16:53:00   
EDT, Weight   
Dosing Start   
Date: 24   
Status: Ordered  
  
  
  
                                        Start: 10-   take 1 tablet by summer  
 once   
daily                                   meloxicam 15 mg Tab See Instructions,   
TAKE 1 TABLET BY MOUTH DAILY, # 30   
tab(s), Refills(s) 0, Pharmacy:   
[facility] #[id number], 152.8,   
cm, 24 16:53:00 EDT,   
Height/Length Dosing, 71.2, kg,   
24 16:53:00 EDT, Weight Dosing   
Start Date: 10/7/24 Status: Ordered  
  
  
  
                                                    ospemifene 60 mg oral   
tablet  
(2 sources)                             Start: 2023   take 1 tablet by   
mouth once daily                        Ospemifene (OSPHENA) 60   
MG TABS Indications:   
Lichen sclerosus of   
female genitalia Take 1   
tablet by mouth daily 30   
tablet 12 2023   
Active  
  
  
  
                                        Start: 2021   take 1 tablet by summer  
th once   
daily                                   Ospemifene (OSPHENA) 60 MG TABS   
Indications: Lichen sclerosus of   
female genitalia TAKE 1 TABLET BY   
MOUTH EVERY DAY 30 tablet 12   
2021 Active  
  
  
  
  
  
Problems  
  
  
                                                    Problem   
Classification  Problem         Date            Documented Date Episodic/Chronic  
   
                                                    Other connective   
tissue disease  
(2 sources)     Foot pain                       2024      Episodic  
   
                                                    Other nutritional;   
endocrine; and   
metabolic disorders  
(2 sources)                             Body mass index 30+ -   
obesity                                 2024          Chronic  
   
                                                    Other nutritional;   
endocrine; and   
metabolic disorders  
(2 sources)                             Obesity caused by   
energy imbalance                        2024          Chronic  
   
                                                    Other screening for   
suspected conditions   
(not mental disorders   
or infectious   
disease)  
(1 source)                              Screening for   
malignant neoplasm of   
colon done;   
Translations:   
[Encounter for   
screening for   
malignant neoplasm of   
colon]                                  Onset:   
2024                                          Episodic  
   
                                                    Residual codes;   
unclassified  
(1 source)                              Family history of   
cancer of colon                         2024          Episodic  
   
                                                    Thyroid disorders  
(3 sources)     Hypothyroidism                  11-      Chronic  
   
                                                    Unclassified  
(6 sources)                             Patient encounter   
status; Translations:   
[Women's annual   
routine gynecological   
examination]                            2024            
  
  
  
Results  
  
  
                          Test Name    Value        Interpretation Reference   
Range                                   Facility  
   
                                                    [facility]ind[place] 2025   
   
                                        Reminders           Reminders  
------------------  
---  
From: Radha Estrella LPN  
To: N -   
Clinical;  
Sent: 2025   
09:55:38 EST Show   
up: 12/15/2034   
07:00:00 EST  
Subject:   
colonoscopy recall  
Due Date/Time:   
1/15/2035 07:00:00   
EST  
Reminder/Recall  
Patient due for   
screening   
colonoscopy   
01/15/2035.         Normal                                  [facility]  
   
                                                    Ambulatory Visit Summaryon 1  
2024   
   
                                                    Ambulatory Visit   
Summary                                 Ambulatory Visit   
Summary  
[Image Removed]  
BURTON RAMÍREZ  
:1960  
MRN:[id number]  
Visit   
Date:2024  
Ambulatory Visit   
Instructions  
Your Diagnosis  
Screening for   
malignant neoplasm   
of colon  
Your Care Team  
Attending   
Physician -  
Jeovanny ROWAN MD  
Primary Care   
Physician -  
Schwab FNP, Jodi L  
Referring   
Physician -  
Schwab FNP, Jodi L  
This Is Your   
Medications List  
Contact   
prescribing   
physician if   
questions or   
concerns  
levothyroxine   
(levothyroxine 125   
mcg (0.125 mg)   
Tab)  
meloxicam   
(meloxicam 15 mg   
Tab)  
Procedures   
Performed  
Appendectomy,   
Carpal tunnel   
release,   
Colonoscopy.  
Discharge Vitals  
Heart Rate   
(Peripheral)  
70  
Respiratory Rate  
16  
Blood Pressure  
122/84  
Height  
152.8 cm  
Height  
60 in  
Weight  
73.6 kg  
Weight  
162.26 lb  
BMI  
31.52  
Medications  
What How Much When   
Instructions  
Unchanged   
levothyroxine   
(levothyroxine 125   
mcg (0.125 mg)   
Tab) 1 Tablets By   
Mouth Every day   
Contact   
prescribing   
physician if   
questions or   
concerns  
Unchanged   
meloxicam   
(meloxicam 15 mg   
Tab) See   
instructions TAKE   
1 TABLET BY MOUTH   
DAILY Contact   
prescribing   
physician if   
questions or   
concerns  
Allergies  
No Known Allergies  
Problems  
Ongoing - Any   
problem that you   
are currently   
receiving   
treatment for.  
BMI   
31.0-31.9,adult  
Class 1 obesity   
due to excess   
calories with body   
mass index (BMI)   
of 30.0 to 30.9 in   
adult  
Colon cancer   
screening  
Family history of   
colon cancer in   
mother  
Hypothyroidism  
Left foot pain  
Screening for   
malignant neoplasm   
of colon  
Wellness   
examination  
Patient Survey  
You may receive a   
survey via text or   
e-mail asking   
about your office   
visit. Please   
share your   
experience with us   
by completing your   
survey. We   
appreciate your   
feedback and thank   
you for choosing   
us for your care.  
[Image Removed]     Normal                                  [facility]  
   
                                                    Family Medicine Office/Clini  
c Noteon 2024   
   
                                                    Family Medicine   
Office/Clinic Note                      Family Medicine   
Office/Clinic Note  
Chief Complaint  
Annual Wellness  
HPI Staff  
Pt is here today   
for annual   
wellness visit.  
Health   
Maintenance:  
Colonoscopy:    
(Normal) believes   
she is due for   
next one.  
Mammo:    
(Normal)  
Pap:  (Normal)   
due  
Last Labs: 23  
Lt foot pain and   
swelling.  
  
History of Present   
Illness  
pt presents today   
for wellness visit  
Review of Systems  
PHQ Score  
Initial Depression   
Screen Score: 0   
SCORE  
Physical Exam  
Vitals &   
Measurements  
T: 36.7   
?C(Temporal   
Artery) HR:   
71(Peripheral) RR:   
16 BP: 128/80   
SpO2: 97%  
HT: 60 in HT:   
152.8 cm WT: 71.20   
kg WT: 156.64 lb   
BMI: 30.5  
General: alert, no   
acute distress  
ENMT: oral mucosa   
moist, no   
pharyngeal   
erythema or   
exudate  
Cardiovascular:   
regular rate and   
rhythm, normal   
peripheral   
perfusion  
Respiratory: Lungs   
CTA, respirations   
non labored  
Extremities: no   
deformity, no   
trauma  
Neurological:   
oriented x 4, LOC   
appropriate for   
age, CN II-XII   
intact, motor   
strength equal &   
normal   
bilaterally,   
speech normal  
Assessment/Plan  
1. Wellness   
examination   
(Z00.00: Encounter   
for general adult   
medical   
examination   
without abnormal   
findings)  
pt presents for   
wellness visit. is   
not quite due for   
labs. does not   
need refills at   
this time. RTC in   
November for lab   
draw  
Ordered:  
CBC w/ Auto Diff  
Comprehensive   
Metabolic Panel  
Est Preventative   
40 to 64 years   
[address]  
Lipid Panel  
Thyroid   
Stimulating   
Hormone  
  
2. Colon cancer   
screening (Z12.11:   
Encounter for   
screening for   
malignant neoplasm   
of colon)  
pt would like   
colonoscopy in   
[place] referral   
sent  
Ordered:  
CBC w/ Auto Diff  
Comprehensive   
Metabolic Panel  
Est Preventative   
40 to 64 years   
[address]  
[facility]   
Ambulatory   
Referral  
Lipid Panel  
Thyroid   
Stimulating   
Hormone  
  
3. Hypothyroidism   
(E03.9:   
Hypothyroidism,   
unspecified)  
TSH ordered for   
after   
Ordered:  
CBC w/ Auto Diff  
Comprehensive   
Metabolic Panel  
Est Preventative   
40 to 64 years   
[address]  
Lipid Panel  
Thyroid   
Stimulating   
Hormone  
  
4. Left foot pain   
(M79.672: Pain in   
left foot)  
slight swelling   
noted on top of   
foot above big   
toe. pt is also   
c/o pain on inside   
arch of foot as   
well. will send   
referral to Dr. izquierdo for   
further   
evaluation. pt   
walks on concrete   
8 hours a day at   
work. encouraged   
good supportive   
shoes  
Ordered:  
Est Preventative   
40 to 64 years   
[address]  
[facility]   
Ambulatory   
Referral  
  
5. BMI   
30.0-30.9,adult,   
(Z68.30: Body mass   
index [BMI]   
30.0-30.9,   
adult)Body mass   
index [BMI]   
30.0-30.9, adult  
BMI education   
given  
Ordered:  
meloxicam, 15 mg =   
1 tab(s), Oral,   
Daily, # 30   
tab(s), Refills(s)   
0, Pharmacy:   
[facility] #[id number],   
152.8, cm,   
24 16:53:00   
EDT, Height/Length   
Dosing, 71.2, kg,   
24 16:53:00   
EDT, Weight Dosing  
CBC w/ Auto Diff  
Comprehensive   
Metabolic Panel  
Est Preventative   
40 to 64 years   
[address]  
[facility]   
Ambulatory   
Referral  
[facility]   
Ambulatory   
Referral  
Lipid Panel  
Thyroid   
Stimulating   
Hormone  
  
6. Former smoker   
(Z87.891: Personal   
history of   
nicotine   
dependence)  
continue not   
smoking  
Ordered:  
meloxicam, 15 mg =   
1 tab(s), Oral,   
Daily, # 30   
tab(s), Refills(s)   
0, Pharmacy:   
[facility] #[id number],   
152.8, cm,   
24 16:53:00   
EDT, Height/Length   
Dosing, 71.2, kg,   
24 16:53:00   
EDT, Weight Dosing  
CBC w/ Auto Diff  
Comprehensive   
Metabolic Panel  
Est Preventative   
40 to 64 years   
[address]  
Lipid Panel  
Thyroid   
Stimulating   
Hormone  
  
7. Class 1 obesity   
due to excess   
calories with body   
mass index (BMI)   
of 30.0 to 30.9 in   
adult (E66.09:   
Other obesity due   
to excess   
calories)  
see above  
Ordered:  
meloxicam, 15 mg =   
1 tab(s), Oral,   
Daily, # 30   
tab(s), Refills(s)   
0, Pharmacy:   
[facility] #[id number],   
152.8, cm,   
24 16:53:00   
EDT, Height/Length   
Dosing, 71.2, kg,   
24 16:53:00   
EDT, Weight Dosing  
CBC w/ Auto Diff  
Comprehensive   
Metabolic Panel  
Est Preventative   
40 to 64 years   
[address]  
Lipid Panel  
Thyroid   
Stimulating   
Hormone  
  
Follow-up  
No qualifying data   
available  
Problem List/Past   
Medical History  
Ongoing  
BMI   
30.0-30.9,adult  
Class 1 obesity   
due to excess   
calories with body   
mass index (BMI)   
of 30.0 to 30.9 in   
adult  
Colon cancer   
screening  
Hypothyroidism  
Left foot pain  
Wellness   
examination  
Historical  
No qualifying data  
Procedure/Surgical   
History  
Appendectomy,   
Surgery.  
Medications  
levothyroxine 125   
mcg (0.125 mg)   
Tab, 125 mcg= 1   
tab(s), Oral,   
Daily, 3 refills  
meloxicam 15 mg   
Tab, 15 mg= 1   
tab(s), Oral,   
Daily  
Allergies  
No Known Allergies  
Social History  
Tobacco  
Former smoker,   
quit more than 30   
days ago Tobacco   
Use:. Never   
Smokeless Tobacco   
Use:. Cigarettes,   
Household tobacco   
concerns: No. Yes,   
2024  
Family History  
Cancer: Mother and   
Father.             Normal                                  [facility]  
   
                                        Comment on above:   Result Comment: Elec  
tronically Signed By: Schwab FNP, Jodi L\.br\Date and Time Signed: 24 17:18 EDT   
   
                                                    [facility]ind[place] 02-   
   
                                        Reminders           ------------------  
---  
From: Schwab FNP, Jodi L  
To: B -   
Clinical;  
Sent: 2/15/2024   
08:20:47 EST Show   
up: 2/15/2024   
08:21:00 EST  
Subject:   
Ambulatory   
Reminder  
Due Date/Time:   
2024 08:20:00   
EST  
TSh is normal  
Results:  
Date Result Name   
Value Ref Range  
2024 15:39   
TSH 1.32 mcIU/mL   
(0.34 - 5.60)  
Patient informed   
and voices   
understanding.      Normal                                  [facility]  
   
                                                    Ambulatory Visit Summaryon 0  
2024   
   
                                                    Ambulatory Visit   
Summary                                 [Image Removed]  
BURTON RAMÍREZ  
:1960  
MRN:[id number]  
Visit   
Date:2024  
Ambulatory Visit   
Instructions  
Your Care Team  
Attending   
Physician -  
Schwab FNP, Jodi L  
Primary Care   
Physician -  
Schwab FNP, Jodi L  
This Is Your   
Medications List  
levothyroxine   
(levothyroxine 125   
mcg (0.125 mg)   
Tab)  
Procedures   
Performed  
Appendectomy,   
Surgery.  
Medications  
What How Much When   
Instructions  
Unchanged   
levothyroxine   
(levothyroxine 125   
mcg (0.125 mg)   
Tab) 1 Tablets By   
Mouth Every day  
Allergies  
No Known Allergies  
Problems  
Ongoing - Any   
problem that you   
are currently   
receiving   
treatment for.  
Hypothyroidism  
Patient Survey  
You may receive a   
survey via text or   
e-mail asking   
about your office   
visit. Please   
share your   
experience with us   
by completing your   
survey. We   
appreciate your   
feedback and thank   
you for choosing   
us for your care.  
[Image Removed]     Normal                                  [facility]  
   
                                                    CHEMISTRYOrdered By: SYSTEM   
SYSTEM on 2024   
   
                          TSH Qn       1.32 m[IU]/L Normal       0.34 - 5.60   
mcIU/mL                                 [facility]  
   
                                                    Nurse Consultation Noteon 02  
-   
   
                                                    Nurse Consultation   
Note                                    Reason for Visit  
blood draw  
Assessment/Plan  
This nurse   
verifies patient's   
 and order.   
Area is cleansed   
with alcohol swab,   
turnique applied.   
Blood is drawn   
into a light green   
tube out of the   
left antecubital   
area. bandage   
applied to area.  
Medications  
levothyroxine 125   
mcg (0.125 mg)   
Tab, 125 mcg= 1   
tab(s), Oral,   
Daily  
Allergies  
No Known Allergies  Normal                                  [facility]  
   
                                                    TSHon 2024   
   
                      TSH Qn     1.32 m[IU]/L Normal     0.34-5.60  [facility]  
   
                                        Comment on above:   Performed By: #### 2  
[id number] ####  
[facility]  
[address] [place] [address]   
   
                                                    CHEMISTRYOrdered By: SYSTEM   
SYSTEM on 2023   
   
                          Albumin [Mass/Vol] 4.2 g/dL     Normal       3.3 - 5.0  
   
gm/dL                                   [facility]  
   
                                                    Albumin/Globulin [Mass   
ratio]          1.3 {ratio}     Normal          1.1 - 2.2       [facility]  
   
                                                    ALP [Catalytic   
activity/Vol]       63 [iU]/d           Normal              21 - 98   
Int._Unit/L                             [facility]  
   
                                                    ALT No additional   
P-5'-P [Catalytic   
activity/Vol]       16 [iU]/d           Normal              6 - 46   
Int._Unit/L                             [facility]  
   
                      Anion gap [Moles/Vol] 10 mmol/L  Normal     6 - 16 mEq/L F  
[facility]  
   
                                                    AST [Catalytic   
activity/Vol]       19 [iU]/d           Normal              5 - 43   
Int._Unit/L                             [facility]  
   
                          Bilirubin [Mass/Vol] 0.4 mg/dL    Normal       0.0 - 1  
.1   
mg/dL                                   [facility]  
   
                          Calcium [Mass/Vol] 10.0 mg/dL   Normal       8.9 - 11.  
1   
mg/dL                                   [facility]  
   
                          Chloride [Moles/Vol] 106 mmol/L   Normal       101 - 1  
11   
mmol/L                                  [facility]  
   
                          Cholesterol [Mass/Vol] 209 mg/dL    High         120 -  
 200   
mg/dL                                   [facility]  
   
                                                    Cholesterol in HDL   
[Mass/Vol]                67 mg/dL                  Invalid   
Interpretation   
Code                                                [facility]  
   
                                        Comment on above:   Interpretive Data: H  
DL > or equal to 60 mg/dL: Low   
cardiovascular risk  
HDL < 40 mg/dL : High cardiovascular risk   
   
                                                    Cholesterol in LDL   
[Mass/Vol]      117 mg/dL       Normal          <=129mg/dL      [facility]  
   
                                                    Cholesterol in VLDL   
[Mass/Vol]      17 mg/dL        Normal          7 - 40 mg/dL    [facility]  
   
                          CO2 [Moles/Vol] 29 mmol/L    Normal       21 - 31   
mmol/L                                  [facility]  
   
                          Creatinine [Mass/Vol] 0.8 mg/dL    Normal       0.5 -   
1.3   
mg/dL                                   [facility]  
   
                                                    GFR/1.73 sq   
M.predicted among   
non-blacks MDRD   
(S/P/Bld) [Vol   
rate/Area]          84 mL/min/1.73 m2   Normal              >=59mL/min/1.  
73 m2                                   [facility]  
   
                                        Comment on above:   Interpretive Data: C  
hronic kidney disease could be indicated   
at   
eGFR's of less than 60 mL/min/1.73m2. Kidney failure is   
indicated at less than 15 mL/min/1.73m2.   
   
                                                    Globulin (S)   
[Mass/Vol]          3.2 g/dL            Normal              1.4 - 4.0   
gm/dL                                   [facility]  
   
                          Glucose [Mass/Vol] 97 mg/dL     Normal       55 - 199   
mg/dL                                   [facility]  
   
                                        Comment on above:   Interpretive Data: I  
f this glucose result represents a fasting  
   
glucose, interpretation should refer to the following reference   
range: 55-99 mg/dL   
   
                          Potassium [Moles/Vol] 4.2 mmol/L   Normal       3.5 -   
5.3   
mmol/L                                  [facility]  
   
                          Protein [Mass/Vol] 7.4 g/dL     Normal       6.0 - 7.8  
   
gm/dL                                   [facility]  
   
                          Sodium [Moles/Vol] 141 mmol/L   Normal       135 - 145  
   
mmol/L                                  [facility]  
   
                                                    Triglyceride   
[Mass/Vol]      85 mg/dL        Normal          <=149mg/dL      [facility]  
   
                          TSH Qn       1.81 m[IU]/L Normal       0.34 - 5.60   
mcIU/mL                                 [facility]  
   
                                                    Urea nitrogen   
[Mass/Vol]      29 mg/dL        High            5 - 21 mg/dL    [facility]  
   
                                                    Urea   
nitrogen/Creatinine   
[Mass ratio]    36 mg/mg        High            10 - 20         [facility]  
   
                                                    HEMATOLOGYOrdered By: SYSTEM  
 SYSTEM on 2023   
   
                                                    Basophils/100 WBC   
(Bld)           0.4 %           Normal          0.0 - 2.0 %     [facility]   
[facility]  
   
                                                    Basophils/Leukocytes   
Auto (Bld) [Pure #   
fraction]           0.0 E9/L            Normal              0.0 - 0.2   
E9/L                                    [facility]   
[facility]  
   
                                                    Eosinophils/100 WBC   
(Bld)           2.8 %           Normal          0.0 - 8.0 %     [facility]   
[facility]  
   
                                                    Eosinophils/Leukocytes   
Auto (Bld) [Pure #   
fraction]           0.2 E9/L            Normal              0.0 - 0.5   
E9/L                                    [facility]   
[facility]  
   
                                                    Lymphocytes/100 WBC   
(Bld)           28.1 %          Normal          14.0 - 50.0 %   [facility]   
[facility]  
   
                                                    Lymphocytes/Leukocytes   
Auto (Bld) [Pure #   
fraction]           2.0 E9/L            Normal              1.0 - 4.0   
E9/L                                    [facility]   
[facility]  
   
                                                    Monocytes/100 WBC   
(Bld)           8.2 %           Normal          4.0 - 14.0 %    [facility]   
[facility]  
   
                                                    Monocytes/Leukocytes   
Auto (Bld) [Pure #   
fraction]           0.6 E9/L            Normal              0.2 - 1.0   
E9/L                                    [facility]   
[facility]  
   
                                                    Neutrophils/100 WBC   
(Bld)           60.5 %          Normal          36.0 - 75.0 %   [facility]   
[facility]  
   
                                                    Neutrophils/Leukocytes   
Auto (Bld) [Pure #   
fraction]           4.4 E9/L            Normal              2.0 - 7.5   
E9/L                                    [facility]   
[facility]  
   
                                                    HEMATOLOGYOrdered By: Edith Neal on 2023   
   
                                                    Erythrocyte   
distribution width   
(RBC) [Ratio]   13.6 %          Normal          10.9 - 14.2 %   [facility]   
[facility]  
   
                                                    Hematocrit (Bld)   
[Volume fraction] 39.3 %          Normal          34.0 - 46.0 %   [facility]   
[facility]  
   
                                                    Hemoglobin (Bld)   
[Mass/Vol]          13.1 g/dL           Normal              12.0 - 16.0   
gm/dL                                   [facility]   
[facility]  
   
                                                    MCH (RBC) [Entitic   
mass]               31.1 pg             Normal              27.0 - 34.0   
pg                                      [facility]   
[facility]  
   
                          MCHC (RBC) [Mass/Vol] 33.3 g/dL    Normal       31.4 -  
 36.0   
gm/dL                                   [facility]   
[facility]  
   
                                                    MCV (RBC) [Entitic   
vol]                93.6 fL             Normal              80.0 - 100.0   
fL                                      [facility]   
[facility]  
   
                                                    Platelet mean volume   
(Bld) [Entitic vol] 9.0 fL          Normal          6.4 - 10.8 fL   [facility]   
[facility]  
   
                                                    Platelets (Bld)   
[#/Vol]             306.0 E9/L          Normal              150.0 - 500.0   
E9/L                                    [facility]   
[facility]  
   
                          RBC (Bld) [#/Vol] 4.2 E12/L    Low          4.3 - 5.9   
E12/L                                   [facility]   
[facility]  
   
                                                    WBC corrected for nucl   
RBC Auto (Bld) [#/Vol] 7.2 E9/L            Normal              4.0 - 11.0   
E9/L                                    [facility]   
[facility]  
   
                                                    Cytologyon 2023   
   
                                        Cytology            (NOTE)  
Path Number:   
[id number]  
DIAGNOSIS  
Cervical material,   
(ThinPrep vial,   
Imaging-assisted   
review):  
Specimen Adequacy:  
Satisfactory for   
evaluation.  
-Endocervical/mcdowell  
sformation zone   
component is   
absent.  
Descriptive   
Diagnosis:  
Atypical squamous   
cells of   
undetermined   
significance   
(ASC-US).  
  
Cytotech Screener:   
EY  
**Electronically   
Signed Out**  
Montez Kmuar M.D.  
rdd/2023  
Procedure/Addendum  
HPV Procedure   
Report  
High Risk HPV   
testing was   
ordered Date   
Ordered: 2023  
Status: Ordered  
Source of   
Specimen:  
A: Cervical   
material,   
(ThinPrep vial,   
Imaging-assisted   
review)  
HPV   
Reflex?...........  
...........HPV if   
Abnormal  
Clinical History  
Postmenopausal  
Z01.419 Routine   
gyn exam without   
abnormal findings  
Processing Lab:   
[address]   
[id number]  
Interpretation   
performed at [address]   
[id number]  
The Pap smear is a   
screening test   
primarily for   
squamous   
epithelial  
lesions, which is   
subject to both   
false negative and   
false positive  
results. Your   
patient should be   
reminded to   
consult you   
immediately if  
she experiences   
any suspicious   
signs or symptoms,   
regardless of her  
Pap smear result.  
GYNECOLOGIC   
CYTOLOGY REPORT  
Patient Name:   
BURTON RAMÍREZLg  
[facility] Rec: [id number]  
[facility]  
[facility]   
[facility]   
[facility]  
ANATOMIC PATHOLOGY  
[address] [place] [id number] [phone]  
Fax: [phone]            Normal                                  [facility]  
   
                                        Comment on above:   Performed By: #### P  
PPVP ####  
[employer]  
[address] [place] [address]  
[phone]  
: Kalpesh Wooten MD   
   
                                                    CBC AUTO DIFFon 2022   
   
                      BASO #     0.0 103/ul Normal     0.0-0.1    [facility]  
   
                                        Comment on above:   Performed By: #### C  
BC ####  
[facility]  
[address] [address]  
Dr. Xuan Westfall   
   
                                                    Basophils/100 WBC   
(Bld)           0.7 %           Normal          0.2-2.0         [facility]  
   
                                        Comment on above:   Performed By: #### C  
BC ####  
[facility]  
[address] [address]  
Dr. Xuan Westfall   
   
                      EO #       0.1 103/ul Normal     0.0-0.7    [facility]  
   
                                        Comment on above:   Performed By: #### C  
BC ####  
[facility]  
[address] [place] [address]  
Dr. Xuan Westfall   
   
                                                    Eosinophils/100 WBC   
(Bld)           2.4 %           Normal          0.9-7.0         [facility]  
   
                                        Comment on above:   Performed By: #### C  
BC ####  
[facility]  
[address] [place] [address]  
Dr. Xuan Westfall   
   
                                                    Erythrocyte   
distribution width   
(RBC) [Ratio]   12.5 %          Normal          11.0-15.0       [facility]  
   
                                        Comment on above:   Performed By: #### C  
BC ####  
[facility]  
[address] [place] [address]  
Dr. Xuan Westfall   
   
                                                    Hematocrit (Bld)   
[Volume fraction] 40.5 %          Normal          36.0-48.0       [facility]  
   
                                        Comment on above:   Performed By: #### C  
BC ####  
[facility]  
[address] [place] [address]  
Dr. Xuan Westfall   
   
                                                    Hemoglobin (Bld)   
[Mass/Vol]      13.6 g/dL       Normal          12.0-16.0       [facility]  
   
                                        Comment on above:   Performed By: #### C  
BC ####  
[facility]  
[address] [place] [address]  
Dr. Xuan Westfall   
   
                      IG #       0.01 10e3/ul Normal     0.00-0.03  [facility]  
   
                                        Comment on above:   Performed By: #### C  
BC ####  
[facility]  
[address] [place] [address]  
Dr. Xuan Westfall   
   
                      IG %       0.2 %      Normal     0.0-0.5    [facility]  
   
                                        Comment on above:   Performed By: #### C  
BC ####  
[facility]  
[address] [place] [address]  
Dr. Xuan Westfall   
   
                      LYMPH #    1.3 103/ul Normal     1.2-3.8    [facility]  
   
                                        Comment on above:   Performed By: #### C  
BC ####  
[facility]  
[address] [place] [address]  
Dr. Xuan Westfall   
   
                                                    Lymphocytes/100 WBC   
(Bld)           23.3 %          Normal          20.5-60.0       [facility]  
   
                                        Comment on above:   Performed By: #### C  
BC ####  
[facility]  
[address] [place] [address]  
Dr. Xuan Westfall   
   
                      MANUAL DIFF REQ NO         Normal                [facility]  
   
                                        Comment on above:   Performed By: #### C  
BC ####  
[facility]  
[address] [place] [address]  
Dr. Xuan Westfall   
   
                                                    MCH (RBC) [Entitic   
mass]           31.2 pg         Normal          26.7-34.0       [facility]  
   
                                        Comment on above:   Performed By: #### C  
BC ####  
[facility]  
[address] [place] [address]  
Dr. Xuan Westfall   
   
                      MCHC (RBC) [Mass/Vol] 33.6 g/dL  Normal     29.9-35.2  [facility]  
 [facility]  
   
                                        Comment on above:   Performed By: #### C  
BC ####  
[facility]  
[address] [place] [address]  
Dr. Xuan Westfall   
   
                                                    MCV (RBC) [Entitic   
vol]            92.9 fL         Normal          81.0-99.0       [facility]  
   
                                        Comment on above:   Performed By: #### C  
BC ####  
[facility]  
[address] [place] [address]  
Dr. Xuan Westfall   
   
                      MONO #     0.5 103/ul Normal     0.3-0.8    [facility]  
   
                                        Comment on above:   Performed By: #### C  
BC ####  
[facility]  
[address] [place] [address]  
Dr. Xuan Westfall   
   
                                                    Monocytes/100 WBC   
(Bld)           9.0 %           Normal          1.7-12.0        [facility]  
   
                                        Comment on above:   Performed By: #### C  
BC ####  
[facility]  
[address] [place] [address]  
Dr. Xuan Westfall   
   
                      NEUT #     3.5 103/ul Normal     1.4-6.5    [facility]  
   
                                        Comment on above:   Performed By: #### C  
BC ####  
[facility]  
[address] [place] [address]  
Dr. Xuan Westfall   
   
                                                    Neutrophils/100 WBC   
(Bld)           64.4 %          Normal          43.0-75.0       [facility]  
   
                                        Comment on above:   Performed By: #### C  
BC ####  
[facility]  
[address] [place] [address]  
Dr. Xuan Westfall   
   
                                                    Platelet mean volume   
(Bld) [Entitic vol] 9.6 fL          Normal          9.5-13.5        [facility]  
   
                                        Comment on above:   Performed By: #### C  
BC ####  
[facility]  
[address] [place] [address]  
Dr. Xuan Westfall   
   
                      PLT        290 103/ul Normal     150-450    [facility]  
   
                                        Comment on above:   Performed By: #### C  
BC ####  
[facility]  
[address] [place] [address]  
Dr. Xuan Westfall   
   
                      RBC        4.36 106/ul Normal     4.20-5.40  [facility]  
   
                                        Comment on above:   Performed By: #### C  
BC ####  
[facility]  
[address] [place] [address]  
Dr. Xuan Westfall   
   
                      WBC        5.5 103/ul Normal     4.0-11.0   [facility]  
   
                                        Comment on above:   Performed By: #### C  
BC ####  
[facility]  
[address] [place] [address]  
Dr. Xuan Westfall   
   
                                                    FREE T3on 2022   
   
                      FREE T3    2.22 pg/mlL Normal     2.18-3.98  [facility]  
   
                                        Comment on above:   Performed By: #### C  
MP, TSH, FT3, LIPID ####  
[facility]  
[address] [place] [address]  
Dr. Xuan Westfall   
   
                                                    FREE T4on 2022   
   
                      Free T4 [Mass/Vol] 0.94 ng/dL Normal     0.76-1.46  [facility]  
   
                                        Comment on above:   Performed By: #### F  
T4 ####  
[facility]  
[address] [address]  
Dr. Xuan Westfall   
   
                                                    LIPID PROFILEon 2022   
   
                      CHOL-HDL RATIO NORM SEE BELOW  Normal                [facility]  
   
                                        Comment on above:   Result Comment: 3.3   
- 4.4 LOW RISK 4.4 - 7.1 AVERAGE RISK 7.1   
-   
11.0 MODERATE RISK >11.0 HIGH RISK   
   
                                                            Performed By: #### C  
MP, TSH, FT3, LIPID ####  
[facility]  
[address] [place] [address]  
Dr. Xuan Westfall   
   
                      Cholesterol [Mass/Vol] 186 mg/dL  Normal     <=200      Th  
[facility]  
   
                                        Comment on above:   Performed By: #### C  
MP, TSH, FT3, LIPID ####  
[facility]  
[address] [address]  
Dr. Xuan Westfall   
   
                                                    Cholesterol in HDL   
[Mass/Vol]      86 mg/dL        Critically high 40-60           [facility]  
   
                                        Comment on above:   Performed By: #### C  
MP, TSH, FT3, LIPID ####  
[facility]  
[address] [place] [address]  
Dr. Xuan Westfall   
   
                                                    Cholesterol in LDL   
[Mass/Vol]      89.8 mg/dL      Normal                          [facility]  
   
                                        Comment on above:   Performed By: #### C  
MP, TSH, FT3, LIPID ####  
[facility]  
[address] [place] [address]  
Dr. Xuan Westfall   
   
                                                    Cholesterol.total/Chol  
esterol in HDL [Mass   
ratio]          2.2 {ratio}     Normal                          [facility]  
   
                                        Comment on above:   Performed By: #### C  
MP, TSH, FT3, LIPID ####  
[facility]  
[address] [place] [address]  
Dr. Xuan Westfall   
   
                                        HDL NORMAL          > or = 60 mg/dl -   
LOW CARDIOVASCULAR   
RISK <40 mg/dl -   
HIGH   
CARDIOVASCULAR   
RISK                Normal                                  [facility]  
   
                                        Comment on above:   Performed By: #### C  
MP, TSH, FT3, LIPID ####  
[facility]  
[address] [address]  
Dr. Xuan Westfall   
   
                      LDL CALC NORMAL SEE BELOW  Normal                [facility]  
   
                                        Comment on above:   Result Comment: <100  
 mg/dl OPTIMAL 100 - 129 mg/dl NEAR OR   
ABOVE OPTIMAL 130 - 159 mg/dl BORDERLINE HIGH 160 - 189 mg/dl   
HIGH >190 mg/dl VERY HIGH   
   
                                                            Performed By: #### C  
MP, TSH, FT3, LIPID ####  
[facility]  
[address] [address]  
Dr. Xuan Westfall   
   
                                                    Triglyceride   
[Mass/Vol]      51 mg/dL        Normal          <=150           [facility]  
   
                                        Comment on above:   Performed By: #### C  
MP, TSH, FT3, LIPID ####  
[facility]  
[address] [address]  
Dr. Xuan Westfall   
   
                      VLDL CALC  10.2 mg/dL Normal                [facility]  
   
                                        Comment on above:   Performed By: #### C  
MP, TSH, FT3, LIPID ####  
[facility]  
[address] [address]  
Dr. Xuan Westfall   
   
                                                    PROF 14(COMP METB)on -  
022   
   
                      Albumin [Mass/Vol] 3.8 g/dL   Normal     3.4-5.0    [facility]  
   
                                        Comment on above:   Performed By: #### C  
MP, TSH, FT3, LIPID ####  
[facility]  
[address] [address]  
Dr. Xuan Westfall   
   
                                                    Albumin/Globulin [Mass   
ratio]          1.2 {ratio}     Normal                          [facility]  
   
                                        Comment on above:   Performed By: #### C  
MP, TSH, FT3, LIPID ####  
[facility]  
[address] [address]  
Dr. Xuan Westfall   
   
                                                    ALP [Catalytic   
activity/Vol]   68 U/L          Normal                    [facility]  
   
                                        Comment on above:   Performed By: #### C  
MP, TSH, FT3, LIPID ####  
[facility]  
[address] [address]  
Dr. Xuan Westfall   
   
                                                    ALT [Catalytic   
activity/Vol]   22 U/L          Normal          14-59           [facility]  
   
                                        Comment on above:   Performed By: #### C  
MP, TSH, FT3, LIPID ####  
[facility]  
[address] [place] [address]  
Dr. Xuan Westfall   
   
                      Anion gap [Moles/Vol] 10.1 mmol/L Normal                Th  
[facility]  
   
                                        Comment on above:   Performed By: #### C  
MP, TSH, FT3, LIPID ####  
[facility]  
[address] [place] [address]  
Dr. Xuan Westfall   
   
                                                    AST [Catalytic   
activity/Vol]   18 U/L          Normal          15-37           [facility]  
   
                                        Comment on above:   Performed By: #### C  
MP, TSH, FT3, LIPID ####  
[facility]  
[address] [place] [address]  
Dr. Xuan Westfall   
   
                      Bilirubin [Mass/Vol] 0.8 mg/dL  Normal     0.2-1.0    [facility]  
   
                                        Comment on above:   Performed By: #### C  
MP, TSH, FT3, LIPID ####  
[facility]  
[address] [place] [address]  
Dr. Xuan Westfall   
   
                      Calcium [Mass/Vol] 9.0 mg/dL  Normal     8.5-10.1   [facility]  
   
                                        Comment on above:   Performed By: #### C  
MP, TSH, FT3, LIPID ####  
[facility]  
[address] [place] [address]  
Dr. Xuan Westfall   
   
                      Chloride [Moles/Vol] 104 mmol/L Normal          [facility]  
   
                                        Comment on above:   Performed By: #### C  
MP, TSH, FT3, LIPID ####  
[facility]  
[address] [place] [address]  
Dr. Xuan Westfall   
   
                      CO2 [Moles/Vol] 30.7 mmol/L Normal     21.0-32.0  [facility]  
   
                                        Comment on above:   Performed By: #### C  
MP, TSH, FT3, LIPID ####  
[facility]  
[address] [place] [address]  
Dr. Xuan Westfall   
   
                      Creatinine [Mass/Vol] 0.81 mg/dL Normal     0.55-1.02  [facility]  
   
                                        Comment on above:   Performed By: #### C  
MP, TSH, FT3, LIPID ####  
[facility]  
[address] [place] [address]  
Dr. Xuan Westfall   
   
                      EGFR-AF AMERICAN >60        Normal     >=60       [facility]  
   
                                        Comment on above:   Performed By: #### C  
MP, TSH, FT3, LIPID ####  
[facility]  
[address] [address]  
Dr. Xuan Westfall   
   
                      EGFR-NON AF AMERICAN >60        Normal     >=60       [facility]  
   
                                        Comment on above:   Performed By: #### C  
MP, TSH, FT3, LIPID ####  
[facility]  
[address] [address]  
Dr. Xuan Westfall   
   
                                                    Globulin (S)   
[Mass/Vol]      3.2 g/dL        Normal                          [facility]  
   
                                        Comment on above:   Performed By: #### C  
MP, TSH, FT3, LIPID ####  
[facility]  
[address] [address]  
Dr. Xuan Westfall   
   
                      Glucose [Mass/Vol] 86 mg/dL   Normal          [facility]  
   
                                        Comment on above:   Performed By: #### C  
MP, TSH, FT3, LIPID ####  
[facility]  
[address] [place] [address]  
Dr. Xuan Westfall   
   
                      Potassium [Moles/Vol] 3.8 mmol/L Normal     3.5-5.1    [facility]  
 [facility]  
   
                                        Comment on above:   Performed By: #### C  
MP, TSH, FT3, LIPID ####  
[facility]  
[address] [place] [address]  
Dr. Xuan Westfall   
   
                      Protein [Mass/Vol] 7.0 g/dL   Normal     6.4-8.2    [facility]  
   
                                        Comment on above:   Performed By: #### C  
MP, TSH, FT3, LIPID ####  
[facility]  
[address] [place] [address]  
Dr. Xuan Westfall   
   
                      Sodium [Moles/Vol] 141 mmol/L Normal     136-145    [facility]  
   
                                        Comment on above:   Performed By: #### C  
MP, TSH, FT3, LIPID ####  
[facility]  
[address] [place] [address]  
Dr. Xuan Westfall   
   
                                                    Urea nitrogen   
[Mass/Vol]      15.0 mg/dL      Normal          7.0-18.0        [facility]  
   
                                        Comment on above:   Performed By: #### C  
MP, TSH, FT3, LIPID ####  
[facility]  
[address] [place], [place] [address]  
Dr. Xuan Westfall   
   
                                                    Urea   
nitrogen/Creatinine   
[Mass ratio]    18.5 mg/mg      Normal                          [facility]  
   
                                        Comment on above:   Performed By: #### C  
MP, TSH, FT3, LIPID ####  
[facility]  
[address] [place], [place] [address]  
Dr. Xuan Westfall   
   
                                                    TSHon 2022   
   
                      TSH        2.843 uIU/mL Normal     0.358-3.740 [facility]  
   
                                        Comment on above:   Performed By: #### C  
MP, TSH, FT3, LIPID ####  
[facility]  
[address] [place], [place] [address]  
Dr. Xuan Westfall   
   
                                                    HAND RIGHT 3 [facility] 10-  
9   
   
                                        HAND RIGHT 3 [facility]  
Department of   
Radiology  
[address] [place] [address]   
[id number] [phone]  
  
  
  
==================  
==  
Patient Name:   
BURTON RAMÍREZ  
: 1960  
Sex: F  
Age:  
Race: White  
MRN: [id number]  
Pt. Location: 84  
Patient Status: O  
Visit #:   
[id number]  
Ordered Date:   
10/16/2019   
10:40:00 AM  
Completed Date:   
10/16/2019 10:36   
AM  
Requesting   
Provider:   
ANITA REILLY  
Attending   
Provider:   
ANITA REILLY  
Report Copy To:  
Signs & Symptoms:   
S67.190A Crushing   
injury of right   
index finger,   
initial encounter   
I10  
History: [place]  
Comments: , Views   
(X-RAY, HAND): PA,   
Lateral, Oblique ,   
Weight Bearing?: N   
, Views (X-RAY,   
HAND): PA,   
Lateral, Oblique ,   
Weight Bearing?: N   
, , ========== ,   
Ordering Provider   
- ANITA REILLY MD , ==========  
Exam: HAND RIGHT 3   
VWS  
Accession #:   
[id number]  
==================  
==  
HAND RIGHT 3 VWS   
10/16/2019 10:36   
AM EDT  
  
SIGNS AND   
SYMPTOMS: S67.190A   
Crushing injury of   
right index   
finger,  
initial encounter   
I10  
  
TECHNOLOGIST   
COMMENTS: rt 2nd   
digit injury 2   
weeks ago  
  
QUESTION FOR THE   
RADIOLOGIST: ,   
Views (X-RAY,   
HAND): PA,   
Lateral,  
Oblique , Weight   
Bearing?: N ,   
Views (X-RAY,   
HAND): PA,   
Lateral,  
Oblique , Weight   
Bearing?: N , ,   
========== ,   
Ordering Provider   
-  
ANITA REILLY MD   
, ==========  
  
PROTOCOL:   
AP,Lateral and   
Oblique views were   
obtained.  
  
COMPARISON: None  
  
FINDINGS:  
  
Soft tissues:  
Mild swelling  
  
Bones:  
No fracture  
  
Joints:  
Mild arthritis   
diffusely  
Pronounced   
spurring at the   
dorsal aspect of   
the long finger   
DIP joint  
  
  
  
IMPRESSION:  
  
1. No acute   
fracture  
2. Apparent   
flexion deformity   
at the index   
finger DIP joint  
3. Scattered   
spurring, most   
pronounced at the   
long finger DIP   
joint  
dorsally  
  
Electronically   
signed by:Rudi Oden.  
  
  
  
Transcribed by:   
[email or web address], User  
Resident:  
Electronically   
Signed by: RUDI ODEN @ 10/16/2019   
01:16 PM            Normal                                  [facility]  
   
                                        Comment on above:   Order Comment: , Yunior  
ws (X-RAY, HAND): PA, Lateral, Oblique ,   
Weight Bearing?: N , Views (X-RAY, HAND): PA, Lateral, Oblique   
, Weight Bearing?: N , , ========== , Ordering Provider - ANITA REILLY MD , ==========   
  
  
  
Vital Signs  
  
  
                      Date Time  Vital Sign Value      Performing Clinician Santy fonseca  
   
                                                    2024   
14:                              Blood Pressure   
Location                                            Jeovanny ROWAN  
Work Phone:   
[phone]                           [facility]   
General Surgery   
[place]  
   
                                                    2024   
14:                              Diastolic blood   
pressure                  84 mm[Hg]                 Jeovanny ROWAN  
Work Phone:   
[phone]                           [facility]  
   
                                                    2024   
14:          Heart rate          70 /min             Jeovanny HARPAL  
Work Phone:   
[phone]                           [facility]  
   
                                                    2024   
14:          Respiratory rate    16 /min             Jeovanny HARPAL  
Work Phone:   
[phone]                           [facility]  
   
                                                    2024   
14:                              Systolic blood   
pressure                  122 mm[Hg]                Jeovanny HARPAL  
Work Phone:   
[phone]                           [facility]  
  
  
  
Encounters  
  
  
                          Encounter Date Encounter Type Care Provider Facility  
   
                                                    Start: 2024  
End: 2024     ambulatory          FNP Agata L Schwab   Facility:[facility]  
   
                                                    Start: 2024  
End: 2024                         Patient encounter   
procedure                               Jeovanny ROWAN  
Work Phone:   
[phone]                           [facility]  
Work Phone:   
[phone]  
   
                          Start: 2024 ambulatory   FNP Agata L Schwab Facil  
ity:FT    
[place]  
   
                          Start: 2024 ambulatory   FNP Agata L Schwab Facil  
ity:FT    
[place]  
   
                                                    Start: 10-  
End: 10-     ambulatory          FNP Agata L Schwab   Facility:[facility]  
   
                                                    Start: 10-  
End: 10-                         Patient encounter   
procedure                               Jeovanny ROWAN  
Work Phone:   
[phone]                           [facility]  
Work Phone:   
[phone]  
   
                          Start: 2024 ambulatory   FNP Agata Schwab Facilit  
y: [place]  
   
                          Start: 2024 ambulatory   FNP Agata Schwab Facilit  
y: [place]  
   
                                                    Start: 2024  
End: 2024     ambulatory          FNP Agata L Schwab   Facility:[facility]   
[place]  
   
                                                    Start: 2024  
End: 2024           Lab Drop off              Agata L Schwab  
Work Phone:   
[phone]                           [facility]  
Work Phone:   
[phone]  
   
                                                    Start: 2024  
End: 2024     ambulatory          FNP Jodi L Schwab   Facility:[facility]  
   
                                                    Start: 2023  
End: 2023           Lab Drop off              Jodi L Schwab  
Work Phone:   
[phone]                           [facility]  
Work Phone:   
[phone]  
   
                                                    Start: 2023  
End: 2023     ambulatory          MANUEL FARRIS     [facility]  
   
                                        Start: 2023   Encounter for   
gynecological examination   
(general) (routine)   
without abnormal findings MANUEL FARRIS             [facility]  
   
                                                    Start: 2023  
End: 2023                         Patient encounter   
procedure                                           [facility]  
   
                                                    Start: 2023  
End: 2023                         Subsequent hospital visit   
by physician                                        [facility]  
   
                                        Comment on above:   Women's annual routi  
ne gynecological examination   
   
                                        Start: 2022   Encounter for genera  
l   
adult medical examination   
without abnormal findings DR MELINDA WILSON           [facility]  
   
                                                    Start: 2022  
End: 2022     ambulatory          DR MELINDA WILSON     Facility:[facility]  
   
                                                    Start: 2022  
End: 2022                         Encounter for general   
adult medical examination   
without abnormal findings DR MELINDA WILSON           Facility:[facility]  
   
                                                    Start: 2021  
End: 2021                         Subsequent hospital visit   
by physician              Melinda Wilson                [facility]  
   
                                        Comment on above:   Women's annual routi  
ne gynecological examination   
  
  
  
Procedures  
  
  
                          Date         Procedure    Procedure Detail Performing   
Clinician  
   
                                        Start: 2021   Microscopic observat  
ion   
[Identifier] in Cervix by   
Cyto stain                                            
   
                                       Appendectomy              Jodi Schwab  
Work Phone:   
[phone]  
   
                                       Colonoscopy               Jeovanny NILL  
Work Phone:   
[phone]  
   
                                                            Decompression of med  
amelia   
nerve                                               Jeovanny NILL  
Work Phone:   
[phone]  
   
                                                            Decompression of med  
amelia   
nerve                                               Jeovanny NILL  
Work Phone:   
[phone]  
   
                                       Surgical procedure              Agata rosas  
Work Phone:   
[phone]  
  
  
  
Plan of Treatment  
  
  
                          Date         Care Activity Detail       Author  
   
                                        Start: 2024   Screening for malign  
ant   
neoplasm of cervix                                  [facility]  
   
                                Start: 2023 Influenza vaccination Flu vacc  
ine (Season   
Ended)                                  [facility]  
   
                                        Start: 2023   Screening for malign  
ant   
neoplasm of colon                                   [employer]  
   
                                        Comment on above:   Postponed from  (Not Indicated)   
   
                                                            Postponed from  (Not Indicated)   
   
                                                            Postponed from    
   
                                        Start: 2022   Screening for malign  
ant   
neoplasm of cervix        Cervical cancer screen    "[facility] Phone:   
[phone]  
   
                                        Start: 2021   COVID-19 Vaccine (3   
-   
Booster for Pfizer   
series)                                 COVID-19 Vaccine (3 -   
Booster for Pfizer   
series)                                 [facility] [employer]  
   
                                        Start: 2020   Screening for malign  
ant   
neoplasm of breast        Breast cancer screen      [employer]  
   
                          Start: 2020 Influenza vaccination Flu vaccine (#  
1) "[facility] Phone:   
[phone]  
   
                                Start: 2012 Shingles Vaccine (1 of 2) Shin  
gles Vaccine (1 of   
2)                                      "[facility] Phone:   
[phone]  
   
                                        Start: 2007   Screening for malign  
ant   
neoplasm of colon                                   [facility] [employer]  
   
                          Start: 2002 Diabetes screen Diabetes screen [facility]  
Work Phone:   
[phone]  
   
                          Start: 2002 Lipid panel               [facility]  
   
                          Start: 1997 Diabetes screen Diabetes screen [facility]  
   
                                        Start: 1992   Screening for malign  
ant   
neoplasm of cervix                      HPV (without or with   
Pap)                                    [facility]  
   
                                        Start: 1981   DTaP/Tdap/Td vaccine  
 (1 -   
Tdap)                                   DTaP/Tdap/Td vaccine (1   
- Tdap)                                 [facility]  
   
                          Start: 1980 Hepatitis C screening Hepatitis C sc  
[facility]  
   
                          Start: 1978 COVID-19 Vaccine (1) COVID-19 Vaccin  
e (1) "[facility] Phone:   
[phone]  
   
                          Start: 1977 HIV screening HIV screen   [facility] [employer]  
 [employer]  
   
                          Start: 1974 Depression Screen Depression Screen   
[facility]  
   
                          Start: 1962 Hepatitis C screening Hepatitis C sc  
[facility]  
Work Phone:   
[phone]  
   
                                                      
End: 2021                         Cytopathology procedure,   
preparation of smear,   
genital source                          [facility] Lab Routine   
Women's annual routine   
gynecological   
examination 1   
Occurrences starting   
2021 until   
2021                              "[facility] Phone:   
[phone]  
   
                                        Comment on above:   1 Occurrences starti  
ng 2021 until 2021   
   
                                                      
End: 2023                         Cytopathology procedure,   
preparation of smear,   
genital source                          [facility] Routine   
Women's annual routine   
gynecological   
examination 1   
Occurrences starting   
2023 until   
2023                              STEVE SHETLONLISA [employer] Phone:   
[phone]  
   
                                        Comment on above:   1 Occurrences starti  
ng 2023 until 2023   
  
  
  
Immunizations  
  
  
                      Immunization Date Immunization Notes      Care Provider Lisbeth randolph  
   
                                        2021          SARS-CoV-2 (COVID-19  
)   
mRNA BNT-162b2 vax                                  Jeovanny NILL  
Work Phone:   
[phone]                           [facility]  
   
                                        Comment on above:   Result Comment: 2024  
-10-24: [id number]   
   
                                        2021          SARS-CoV-2 (COVID-19  
)   
mRNA BNT-162b2 vax                                  Jeovanny NILL  
Work Phone:   
[phone]                           [facility]  
   
                                        Comment on above:   Result Comment: 2024  
-10-24: [id number]   
  
  
  
Payers  
  
  
                          Date         Payer Category Payer        Policy ID  
   
                          2016   Unknown                   [id number] [id number]  
[id number]  
   
                          1960   Unknown                   [id number] [id number]  
[id number]  
   
                          1960   Unknown                   [id number] [id number]  
[id number]  
   
                          1960   Unknown                   [id number] [id number]  
[id number]  
   
                          1960   Unknown                   [id number] [id number]  
[id number]  
   
                          1960   Unknown                   [id number] [id number]  
[id number]  
   
                          1960   Unknown                   [id number] [id number]  
[id number]  
   
                          1960   Unknown                   [id number] [id number]  
[id number]  
   
                          1960   Unknown                   [id number] [id number]  
[id number]  
   
                          1960   Unknown                   [id number] 2.16.8  
[id number]  
   
                          1958   Unknown                   [id number] 1.2.8  
[id number]  
  
  
  
Social History  
  
  
                          Date         Type         Detail       Facility  
   
                                                    Start: 2016  
End: 2021                         Tobacco smoking status   
[facility]IS                      Never smoker              [employer]  
   
                                                    Start: 2016  
End: 2021                         Tobacco use and   
exposure                  Never used                "[facility] Phone:   
[phone]  
   
                          Start: 2021 Alcohol intake Not Asked    [email or web address] Phone:   
[phone]  
   
                          Start: 1960 Sex Assigned At Birth Not on file    
[employer] Phone:   
[phone]  
   
                          Start: 2023 Alcohol intake Ex-drinker (finding)   
[employer] Phone:   
[phone]  
   
                                                Tobacco smoking status No Smokin  
g Status   
Entered                                 [facility]  
   
                                       Sex Assigned At Birth Female       [facility]  
   
                                                    Start: 11-  
End: 2024     Tobacco smoking status Ex-smoker (finding) [facility]  
   
                                        Comment on above:   quit    
   
                                       Tobacco smoking status Never        [facility]  
   
                                        Comment on above:   quit    
  
  
  
Functional Status  
  
  
                          Date         Assessment   Result       Facility  
   
                          2024   Functional Status N/A          [facility] General Surgery   
[facility]  
  
  
  
Clinical Note 2024  
  
  
                                Note Date & Type Note            Facility  
   
                                        12- Note     General Surgery Offi  
ce/Clinic Note  
Chief Complaint  
consultation for colonoscopy  
HPI Staff  
64 year old female presents on   
consultation from Jodi Schwab for   
screening colonoscopy. Denies abdominal   
or rectal pain. No rectal bleeding or   
change in bowel habits. Denies nausea   
or vomiting. No unexplained weight   
loss. Previous colonoscopy in 2014-   
operative report not available,   
reported normal per patient. Mother   
with history of colon cancer, diagnosed   
age 73.  
History of Present Illness  
65 yo female with h/o hypothyroidism,   
referred for colorectal screening;   
denies change in bms or blood in   
stools; no abdominal complaints; denies   
asa use, on Meloxicam prn, no SBE   
prophylaxis; abdominal operations   
significant for appendectomy, last   
colonoscopy , report unavailable;   
fmhx of colon cancer in patient's   
mother, dx in her 70's, no Fmhx of IBD;   
no tobacco use.  
Review of Systems  
PHQ Score  
Initial Depression Screen Score: 0   
SCORE  
ROS - Provider  
Constitutional: no fever, no sweats, no   
weight loss.  
Eyes: no glasses, no blurred vision, no   
visual loss.  
ENMT: no dentures, no hoarseness, no   
swallowing difficulties, no hearing   
loss, no ear infection(s), no nose   
bleeds.  
Cardiovascular: normal blood pressure,   
no chest pain, regular heartbeat, no   
heart murmur.  
Respiratory: no shortness of breath, no   
cough, no asthma, no wheezing.  
Gastrointestinal: no nausea, no   
vomiting, no diarrhea, no constipation,   
no blood in stool, no change in bowel   
habits, no abdominal pain, no   
hepatitis.  
Genitourinary: no kidney stones, no   
urine infection, no dysuria.  
Musculoskeletal: no pain, no weakness.  
Skin: no changing moles, no rash, no   
skin lumps.  
Neurologic: no seizures, no epilepsy,   
no headache.  
Psychiatric: no emotional or   
psychiatric problem.  
Heme/Lymph: no bleeding problems, no   
anemia, no blood clots, no   
transfusions.  
Allergy/Immunologic: no swollen lymph   
nodes/glands, no IV drug abuse.  
Other:  
Additional ROS info: Except as noted in   
the above Review of Systems and in the   
History of Present Illness, all other   
systems have been reviewed and are   
negative or noncontributory.  
  
Physical Exam  
Vitals & Measurements  
HR: 70(Peripheral) RR: 16 BP: 122/84  
HT: 60 in HT: 152.8 cm WT: 73.6 kg WT:   
162.26 lb BMI: 31.52  
HEENT: normal conjunctiva, sclera   
clear, no scleral icterus, EOM intact,   
PERRLA, oral mucosa moist without   
lesions.  
Neck: trachea midline, no mass,   
symmetric, no thyromegaly or nodules,   
no adenopathy  
Respiratory: lungs CTA, respirations   
non labored.  
Cardiovascular: regular rate and   
rhythm, no murmur, no pedal edema or   
varicosities.  
Gastrointestinal: soft, non distended,   
no tenderness, no masses, no palpable   
hernias, diastasis recti no, no   
hepatosplenomegaly; normal bs  
Lymphatic: no cervical adenopathy, no   
supraclavicular adenopathy.  
Musculoskeletal: normal gait, digits   
and nails without infection, nodes,   
cyanosis, clubbing.  
Skin: no rashes, no lesions, no ulcers,   
no subcutaneous nodules, induration.  
Psychiatric/Neuro: oriented to time,   
place, person, judgement normal, affect   
appropriate for age, insight intact, no   
focal deficits.  
Tests: , review of old records   
completed ,  
Discussed surgical options, risks, and   
possible complications with patient.  
Assessment/Plan  
1. Screening for malignant neoplasm of   
colon (Z12.11: Encounter for screening   
for malignant neoplasm of colon)  
plan colonoscopy under anesthesia,   
informed consent obtained.  
Follow-up  
No qualifying data available  
Problem List/Past Medical History  
Ongoing  
BMI 31.0-31.9,adult  
Class 1 obesity due to excess calories   
with body mass index (BMI) of 30.0 to   
30.9 in adult  
Colon cancer screening  
Family history of colon cancer in   
mother  
Hypothyroidism  
Left foot pain  
Screening for malignant neoplasm of   
colon  
Wellness examination  
Historical  
No qualifying data  
Procedure/Surgical History  
Appendectomy, Carpal tunnel release,   
Colonoscopy.  
Medications  
levothyroxine 125 mcg (0.125 mg) Tab,   
125 mcg= 1 tab(s), Oral, Daily, 3   
refills  
meloxicam 15 mg Tab, See Instructions  
Allergies  
No Known Allergies  
Social History  
Alcohol  
Current. Wine. 1-2 times per week.,   
2024  
Substance Abuse  
Never., 2024  
Tobacco  
Former smoker, quit more than 30 days   
ago Tobacco Use:. Never Smokeless   
Tobacco Use:. Cigarettes, 1 per day.   
Started age 21.0 Years. Stopped age 34   
Years., 2024  
Family History  
Primary malignant neoplasm of colon:   
Mother.  
Primary malignant neoplasm of prostate:   
Father.  
Immunizations  
Vaccine Date Status Comments  
SARS-CoV-2 (COVID-19) mRNA BNT-162b2   
vax 2021 Recorded 2024-10-24:   
TPV50  
SARS-CoV-2 (COVID-19) mRNA BNT-162b2   
vax 2021 Recorded 2024-10-24:   
TPV50                                   [facility]  
   
                                        Comment on above:   Result Comment: Elec  
tronically Signed By: HARPAL LUJAN, Jeovanny Amos\Date and Time Signed: 24 15:04 EST   
  
  
  
Evaluation + Plan note 2024  
                                   Laboratory  
  
                                Note Date & Type Note            Facility  
   
                                                    2024 Evaluation + Plan  
   
note                                      
  
  
Future Scheduled TestsCBC w/   
Auto Diff 24Comprehensive   
Metabolic Panel 24Lipid   
Panel 24Thyroid   
Stimulating Hormone 24  
  
                                        [facility]  
Work Phone: [phone]  
  
  
  
Evaluation + Plan note  
  
  
                                Note Date & Type Note            Facility  
   
                                        Evaluation + Plan note   
  
  
Future Appointments  
  
  
Appointment Date:11/15/2023   
03:20:00 PM  
Scheduled Provider:Schwab FNP, Jodi L  
Location:[facility]  
Appointment Type:[facility]  
  
  
  
Evaluation + Plan note  
                                   Laboratory  
  
                                Note Date & Type Note            Facility  
   
                                        Evaluation + Plan note   
  
  
Future Appointments  
  
  
Appointment Date:2024   
03:20:00 PM  
Scheduled Provider:  
Location:[facility]  
Appointment Type: Nurse Visit  
  
  
  
Future Scheduled TestsCBC w/ Auto   
Diff 24Comprehensive   
Metabolic Panel 24Lipid   
Panel 24Thyroid Stimulating   
Hormone 24  
  
                                        [facility]  
Work Phone: [phone]  
  
  
  
Evaluation note  
  
  
                                Note Date & Type Note            Facility  
  
  
  
                                                    Evaluation note   
  
  
  
                                                    Diagnosis  
   
                                                      
  
  
Women's annual routine gynecological examination  
  
documented in this encounter  
[facility] [employer]  
Work Phone: [phone]  
  
Hospital course Narrative  
  
  
                                Note Date & Type Note            Facility  
   
                                        Hospital course Narrative   
  
  
No data available for this   
section                                 [facility]  
  
  
  
Hospital Discharge instructions  
  
  
                                Note Date & Type Note            Facility  
   
                                        Hospital Discharge instructions   
  
  
No data available for this   
section                                 [facility]  
  
  
  
Progress note  
  
  
                                Note Date & Type Note            Facility  
   
                                        Progress note         
  
  
No data available for this section      [facility]  
  
  
  
Summary Purpose  
  
  
                                                      
  
  
  
Family History  
No Family History Records FoundNo Family History Records FoundNo Family History 
Records Found  
  
No data available for this section  
  
  
  
No data available for this section  
  
  
  
No data available for this section  
  
  
  
No data available for this section  
  
No Family History Records Found  
  
Advance Directives  
No Advanced Directives Records FoundDocuments on File  
  
                          Type         Date Recorded Patient Representative Expl  
anation  
   
                          ACP-Advance Directive                             
   
                          ACP-Power of                              
  
  
  
Assessments  
  
  
                                                    Diagnosis  
   
                                                      
  
  
Women's annual routine gynecological examination  
  
  
  
Additional Source Comments  
  
  
  
                                                    INFORMATION SOURCE (unrecogn  
ized section and content)  
   
                                          
  
  
  
                                        DATE CREATED        AUTHOR  
   
                                10/28/2019                      [facility]  
  
  
  
                                DATE CREATED    AUTHOR          AUTHOR'S ORGANIZ  
ATION  
   
                                2022                      [facility]  
pital  
  
  
  
                                DATE CREATED    AUTHOR          AUTHOR'S ORGANIZ  
ATION  
   
                                2023                      [facility]  
p[facility]  
  
  
  
                                DATE CREATED    AUTHOR          AUTHOR'S ORGANIZ  
ATION  
   
                                2025                      [facility]  
  
  
  
  
  
                                                    Patient Care team informatio  
n (unrecognized section and content)  
   
                                                      
  
  
Personnel  
  
  
Name: Schwab FNP, Jodi L  
Address:  
Address: [address] [place] [address]-  
  
  
  
Personnel  
  
  
Name: Schwab FNP, Agata L  
Address:  
Address: [address] [place] [address]-  
  
  
  
Personnel  
  
  
Name: Schwabab MAHMOOD Agata GUNN  
Address:  
Address: [address] [place] [address]-  
  
  
  
Personnel  
  
  
Name: Schwab TIMOTEO Agata GUNN  
Address:  
Address: [address] [place] [address]-  
  
  
FOR RECORDS PERTAINING TO PATIENTS WHO ARE OR HAVE BEEN ENROLLED IN A CHEMICAL 
DEPENDENCY/SUBSTANCEABUSE PROGRAM, SOME INFORMATION MAY BE OMITTED. This 
clinical summary was aggregated from multiple sources. Caution should be 
exercised in using it in the provision of clinical care. This summary normalizes
 information from multiple sources, and as a consequence, information in this 
document may materially change the coding, format and clinical context of 
patient data. In addition, data may be omitted in some cases. CLINICAL DECISIONS
 SHOULD BE BASED ON THE PRIMARY CLINICAL RECORDS. [employer] provides
 no warranty or guarantee of the accuracy or completeness of information in this
 document.

## 2025-03-07 NOTE — MM_ITS
Patient Name:      
BURTON RAMÍREZ 
      
MR#: [id number]      
: 1960 
Accession #: [id number] 
Exam Date: 3/07/2025  
Ordering Doctor: JODI L. SCHWAB . 
  
RADIOLOGY REPORT 
  
PROCEDURE:     MM TOMOSYNTHESIS SCREENING BI 
  
COMPARISON:     MM TOMOSYNTHESIS SCREENING BI, 2023.  MG MAMM SCREEN 3D  
TOM CAD, 2021.  MG MAMM SCREEN TOM W CAD, 2020.  [facility] MAMM TOM SCRN W  
CAD DIG, 2013. 
  
INDICATIONS:     Screening for malignant neoplasm 
  
Calculator Name             NCI Breast Cancer Risk Assessment Tool  
5 Year Breast Cancer Risk     1.70% 
Lifetime Breast Cancer Risk     7.70% 
Personal Breast Cancer      No 
Personal Ovarian Cancer     No 
Treatments     None 
Family Cancers     Mother with colon cancer at age 72; Father with prostate  
cancer at age 74. 
  
LOCATION:       [facility]  
  
BREAST COMPOSITION:     There are scattered areas of fibroglandular density. 
  
FINDINGS:       
DIAGNOSTIC CATEGORY 1--NEGATIVE.   
  
RIGHT BREAST:  No significant suspicious finding.   
  
LEFT BREAST:  No significant suspicious finding.   
  
RECOMMENDATIONS:      
ROUTINE MAMMOGRAM AND CLINICAL EVALUATION IN 12 MONTHS.   
  
PLEASE NOTE:  A NORMAL MAMMOGRAM DOES NOT EXCLUDE THE POSSIBILITY OF BREAST  
CANCER.  A CLINICALLY SUSPICIOUS PALPABLE LUMP SHOULD BE BIOPSIED.   
  
  
Dictated by: Mikhail Mei DO on 3/07/2025 at 16:30      
Approved by: Mikhail Mei DO on 3/07/2025 at 16:32

## 2025-03-12 ENCOUNTER — HOSPITAL ENCOUNTER (OUTPATIENT)
Age: 65
Setting detail: SPECIMEN
Discharge: HOME | End: 2025-03-12
Payer: COMMERCIAL

## 2025-03-12 ENCOUNTER — OFFICE VISIT (OUTPATIENT)
Dept: OBGYN | Age: 65
End: 2025-03-12
Payer: COMMERCIAL

## 2025-03-12 VITALS
BODY MASS INDEX: 28.7 KG/M2 | DIASTOLIC BLOOD PRESSURE: 68 MMHG | HEIGHT: 61 IN | SYSTOLIC BLOOD PRESSURE: 130 MMHG | WEIGHT: 152 LBS

## 2025-03-12 DIAGNOSIS — Z01.419 WOMEN'S ANNUAL ROUTINE GYNECOLOGICAL EXAMINATION: Primary | ICD-10-CM

## 2025-03-12 DIAGNOSIS — Z12.31 SCREENING MAMMOGRAM, ENCOUNTER FOR: ICD-10-CM

## 2025-03-12 DIAGNOSIS — Z01.419 WOMEN'S ANNUAL ROUTINE GYNECOLOGICAL EXAMINATION: ICD-10-CM

## 2025-03-12 DIAGNOSIS — N90.4 LICHEN SCLEROSUS OF FEMALE GENITALIA: ICD-10-CM

## 2025-03-12 PROCEDURE — G0145 SCR C/V CYTO,THINLAYER,RESCR: HCPCS

## 2025-03-12 PROCEDURE — 99396 PREV VISIT EST AGE 40-64: CPT | Performed by: OBSTETRICS & GYNECOLOGY

## 2025-03-12 RX ORDER — MELOXICAM 15 MG/1
15 TABLET ORAL DAILY
COMMUNITY

## 2025-03-12 RX ORDER — CLOBETASOL PROPIONATE 0.5 MG/G
CREAM TOPICAL
Qty: 30 G | Refills: 1 | Status: SHIPPED | OUTPATIENT
Start: 2025-03-12

## 2025-03-12 NOTE — PROGRESS NOTES
YEARLY PHYSICAL    Date of service: 3/12/2025    Mayra Mejia  Is a 64 y.o.   female    PT's PCP is: No primary care provider on file.     : 1960                                             Subjective:       No LMP recorded. Patient is postmenopausal.     Are your menses regular: not applicable    OB History    Para Term  AB Living   2 2 2      SAB IAB Ectopic Molar Multiple Live Births              # Outcome Date GA Lbr Beka/2nd Weight Sex Type Anes PTL Lv   2 Term  40w0d   M CS-Unspec      1 Term  40w0d   F CS-Unspec           Social History     Tobacco Use   Smoking Status Former    Current packs/day: 1.00    Average packs/day: 1 pack/day for 15.0 years (15.0 ttl pk-yrs)    Types: Cigarettes   Smokeless Tobacco Never        Social History     Substance and Sexual Activity   Alcohol Use Yes    Alcohol/week: 1.0 standard drink of alcohol    Types: 1 Drinks containing 0.5 oz of alcohol per week       Family History   Problem Relation Age of Onset    Prostate Cancer Father     Colon Cancer Mother     Breast Cancer Sister        Allergies: Patient has no known allergies.      Current Outpatient Medications:     meloxicam (MOBIC) 15 MG tablet, Take 1 tablet by mouth daily, Disp: , Rfl:     liothyronine (CYTOMEL) 5 MCG tablet, Take 1 tablet by mouth every morning, Disp: , Rfl:     levothyroxine (SYNTHROID) 125 MCG tablet, TK 1 T PO QD, Disp: , Rfl: 3    Ospemifene (OSPHENA) 60 MG TABS, Take 1 tablet by mouth daily (Patient not taking: Reported on 3/12/2025), Disp: 30 tablet, Rfl: 12    clobetasol (TEMOVATE) 0.05 % cream, Apply a thin layer daily as needed , not to exceed 6 weeks at a time (Patient not taking: Reported on 3/12/2025), Disp: 30 g, Rfl: 1    Social History     Substance and Sexual Activity   Sexual Activity Yes    Partners: Male    Birth control/protection: Post-menopausal       Any bleeding or pain with

## 2025-03-24 LAB — CYTOLOGY REPORT: NORMAL

## 2025-03-25 ENCOUNTER — RESULTS FOLLOW-UP (OUTPATIENT)
Dept: OBGYN | Age: 65
End: 2025-03-25